# Patient Record
Sex: FEMALE | Race: WHITE | NOT HISPANIC OR LATINO | Employment: UNEMPLOYED | URBAN - NONMETROPOLITAN AREA
[De-identification: names, ages, dates, MRNs, and addresses within clinical notes are randomized per-mention and may not be internally consistent; named-entity substitution may affect disease eponyms.]

---

## 2017-08-10 ENCOUNTER — HOSPITAL ENCOUNTER (INPATIENT)
Facility: HOSPITAL | Age: 22
LOS: 5 days | Discharge: HOME | End: 2017-08-15
Attending: EMERGENCY MEDICINE | Admitting: INTERNAL MEDICINE

## 2017-08-10 ENCOUNTER — APPOINTMENT (OUTPATIENT)
Dept: GENERAL RADIOLOGY | Facility: HOSPITAL | Age: 22
End: 2017-08-10

## 2017-08-10 DIAGNOSIS — J45.901 ACUTE SEVERE EXACERBATION OF ASTHMA: Primary | ICD-10-CM

## 2017-08-10 PROBLEM — J96.90 RESPIRATORY FAILURE (HCC): Status: ACTIVE | Noted: 2017-08-10

## 2017-08-10 LAB
A-A DO2: 25.4 MMHG (ref 0–300)
ALBUMIN SERPL-MCNC: 5.3 G/DL (ref 3.5–5)
ALBUMIN/GLOB SERPL: 1.3 G/DL (ref 1.5–2.5)
ALP SERPL-CCNC: 139 U/L (ref 35–104)
ALT SERPL W P-5'-P-CCNC: 21 U/L (ref 10–36)
ANION GAP SERPL CALCULATED.3IONS-SCNC: 9.4 MMOL/L (ref 3.6–11.2)
ARTERIAL PATENCY WRIST A: ABNORMAL
AST SERPL-CCNC: 34 U/L (ref 10–30)
ATMOSPHERIC PRESS: 730 MMHG
BASE EXCESS BLDA CALC-SCNC: -3.7 MMOL/L
BASOPHILS # BLD AUTO: 0.03 10*3/MM3 (ref 0–0.3)
BASOPHILS NFR BLD AUTO: 0.2 % (ref 0–2)
BDY SITE: ABNORMAL
BILIRUB SERPL-MCNC: 0.4 MG/DL (ref 0.2–1.8)
BODY TEMPERATURE: 98.6 C
BUN BLD-MCNC: 9 MG/DL (ref 7–21)
BUN/CREAT SERPL: 12.7 (ref 7–25)
CALCIUM SPEC-SCNC: 10.3 MG/DL (ref 7.7–10)
CHLORIDE SERPL-SCNC: 104 MMOL/L (ref 99–112)
CO2 SERPL-SCNC: 26.6 MMOL/L (ref 24.3–31.9)
COHGB MFR BLD: 1.4 % (ref 0–5)
CREAT BLD-MCNC: 0.71 MG/DL (ref 0.43–1.29)
D-LACTATE SERPL-SCNC: 2 MMOL/L (ref 0.5–2)
D-LACTATE SERPL-SCNC: 2.2 MMOL/L (ref 0.5–2)
DEPRECATED RDW RBC AUTO: 45 FL (ref 37–54)
EOSINOPHIL # BLD AUTO: 1.01 10*3/MM3 (ref 0–0.7)
EOSINOPHIL NFR BLD AUTO: 7 % (ref 0–5)
ERYTHROCYTE [DISTWIDTH] IN BLOOD BY AUTOMATED COUNT: 14.3 % (ref 11.5–14.5)
GFR SERPL CREATININE-BSD FRML MDRD: 102 ML/MIN/1.73
GLOBULIN UR ELPH-MCNC: 4 GM/DL
GLUCOSE BLD-MCNC: 106 MG/DL (ref 70–110)
HCO3 BLDA-SCNC: 21.7 MMOL/L (ref 22–26)
HCT VFR BLD AUTO: 42.1 % (ref 37–47)
HCT VFR BLD CALC: 36 % (ref 37–47)
HGB BLD-MCNC: 12.9 G/DL (ref 12–16)
HGB BLDA-MCNC: 12.4 G/DL (ref 12–16)
HOLD SPECIMEN: NORMAL
HOLD SPECIMEN: NORMAL
HOROWITZ INDEX BLD+IHG-RTO: 21 %
IMM GRANULOCYTES # BLD: 0.04 10*3/MM3 (ref 0–0.03)
IMM GRANULOCYTES NFR BLD: 0.3 % (ref 0–0.5)
LYMPHOCYTES # BLD AUTO: 0.98 10*3/MM3 (ref 1–3)
LYMPHOCYTES NFR BLD AUTO: 6.7 % (ref 21–51)
MCH RBC QN AUTO: 26.2 PG (ref 27–33)
MCHC RBC AUTO-ENTMCNC: 30.6 G/DL (ref 33–37)
MCV RBC AUTO: 85.6 FL (ref 80–94)
METHGB BLD QL: 0.5 % (ref 0–3)
MODALITY: ABNORMAL
MONOCYTES # BLD AUTO: 0.37 10*3/MM3 (ref 0.1–0.9)
MONOCYTES NFR BLD AUTO: 2.5 % (ref 0–10)
NEUTROPHILS # BLD AUTO: 12.09 10*3/MM3 (ref 1.4–6.5)
NEUTROPHILS NFR BLD AUTO: 83.3 % (ref 30–70)
OSMOLALITY SERPL CALC.SUM OF ELEC: 278.5 MOSM/KG (ref 273–305)
OXYHGB MFR BLDV: 90 % (ref 85–100)
PCO2 BLDA: 40.6 MM HG (ref 35–45)
PH BLDA: 7.35 PH UNITS (ref 7.35–7.45)
PLATELET # BLD AUTO: 300 10*3/MM3 (ref 130–400)
PMV BLD AUTO: 11.1 FL (ref 6–10)
PO2 BLDA: 69.4 MM HG (ref 80–100)
POTASSIUM BLD-SCNC: 3.8 MMOL/L (ref 3.5–5.3)
PROT SERPL-MCNC: 9.3 G/DL (ref 6–8)
RBC # BLD AUTO: 4.92 10*6/MM3 (ref 4.2–5.4)
SAO2 % BLDCOA: 91.7 % (ref 90–100)
SODIUM BLD-SCNC: 140 MMOL/L (ref 135–153)
WBC NRBC COR # BLD: 14.52 10*3/MM3 (ref 4.5–12.5)
WHOLE BLOOD HOLD SPECIMEN: NORMAL
WHOLE BLOOD HOLD SPECIMEN: NORMAL

## 2017-08-10 PROCEDURE — 80053 COMPREHEN METABOLIC PANEL: CPT | Performed by: EMERGENCY MEDICINE

## 2017-08-10 PROCEDURE — 71010 HC CHEST PA OR AP: CPT

## 2017-08-10 PROCEDURE — 87040 BLOOD CULTURE FOR BACTERIA: CPT | Performed by: EMERGENCY MEDICINE

## 2017-08-10 PROCEDURE — 99285 EMERGENCY DEPT VISIT HI MDM: CPT

## 2017-08-10 PROCEDURE — 83735 ASSAY OF MAGNESIUM: CPT | Performed by: PHYSICIAN ASSISTANT

## 2017-08-10 PROCEDURE — 83605 ASSAY OF LACTIC ACID: CPT | Performed by: EMERGENCY MEDICINE

## 2017-08-10 PROCEDURE — 36600 WITHDRAWAL OF ARTERIAL BLOOD: CPT | Performed by: EMERGENCY MEDICINE

## 2017-08-10 PROCEDURE — 25010000002 AZITHROMYCIN: Performed by: EMERGENCY MEDICINE

## 2017-08-10 PROCEDURE — 99223 1ST HOSP IP/OBS HIGH 75: CPT | Performed by: INTERNAL MEDICINE

## 2017-08-10 PROCEDURE — 83050 HGB METHEMOGLOBIN QUAN: CPT | Performed by: EMERGENCY MEDICINE

## 2017-08-10 PROCEDURE — 71010 XR CHEST 1 VW: CPT | Performed by: RADIOLOGY

## 2017-08-10 PROCEDURE — 25010000002 CEFTRIAXONE: Performed by: EMERGENCY MEDICINE

## 2017-08-10 PROCEDURE — 94799 UNLISTED PULMONARY SVC/PX: CPT

## 2017-08-10 PROCEDURE — 25010000002 LORAZEPAM PER 2 MG: Performed by: EMERGENCY MEDICINE

## 2017-08-10 PROCEDURE — 82805 BLOOD GASES W/O2 SATURATION: CPT | Performed by: EMERGENCY MEDICINE

## 2017-08-10 PROCEDURE — 94640 AIRWAY INHALATION TREATMENT: CPT

## 2017-08-10 PROCEDURE — 36415 COLL VENOUS BLD VENIPUNCTURE: CPT

## 2017-08-10 PROCEDURE — 82375 ASSAY CARBOXYHB QUANT: CPT | Performed by: EMERGENCY MEDICINE

## 2017-08-10 PROCEDURE — 25010000002 EPINEPHRINE 1 MG/ML SOLUTION: Performed by: EMERGENCY MEDICINE

## 2017-08-10 PROCEDURE — 85025 COMPLETE CBC W/AUTO DIFF WBC: CPT | Performed by: EMERGENCY MEDICINE

## 2017-08-10 RX ORDER — ALBUTEROL SULFATE 2.5 MG/3ML
2.5 SOLUTION RESPIRATORY (INHALATION) ONCE
Status: COMPLETED | OUTPATIENT
Start: 2017-08-10 | End: 2017-08-10

## 2017-08-10 RX ORDER — SODIUM CHLORIDE 9 MG/ML
100 INJECTION, SOLUTION INTRAVENOUS CONTINUOUS
Status: DISCONTINUED | OUTPATIENT
Start: 2017-08-11 | End: 2017-08-11

## 2017-08-10 RX ORDER — SODIUM CHLORIDE 9 MG/ML
125 INJECTION, SOLUTION INTRAVENOUS CONTINUOUS
Status: DISCONTINUED | OUTPATIENT
Start: 2017-08-10 | End: 2017-08-10

## 2017-08-10 RX ORDER — SODIUM CHLORIDE 9 MG/ML
125 INJECTION, SOLUTION INTRAVENOUS CONTINUOUS
Status: DISCONTINUED | OUTPATIENT
Start: 2017-08-10 | End: 2017-08-11 | Stop reason: DRUGHIGH

## 2017-08-10 RX ORDER — IPRATROPIUM BROMIDE AND ALBUTEROL SULFATE 2.5; .5 MG/3ML; MG/3ML
SOLUTION RESPIRATORY (INHALATION)
Status: COMPLETED
Start: 2017-08-10 | End: 2017-08-10

## 2017-08-10 RX ORDER — BUPRENORPHINE 2 MG/1
8 TABLET SUBLINGUAL ONCE
Status: COMPLETED | OUTPATIENT
Start: 2017-08-10 | End: 2017-08-10

## 2017-08-10 RX ORDER — IPRATROPIUM BROMIDE AND ALBUTEROL SULFATE 2.5; .5 MG/3ML; MG/3ML
3 SOLUTION RESPIRATORY (INHALATION)
Status: DISCONTINUED | OUTPATIENT
Start: 2017-08-10 | End: 2017-08-15 | Stop reason: HOSPADM

## 2017-08-10 RX ORDER — SODIUM CHLORIDE 0.9 % (FLUSH) 0.9 %
10 SYRINGE (ML) INJECTION AS NEEDED
Status: DISCONTINUED | OUTPATIENT
Start: 2017-08-10 | End: 2017-08-15 | Stop reason: HOSPADM

## 2017-08-10 RX ORDER — EPINEPHRINE 1 MG/ML
0.3 INJECTION INTRAMUSCULAR; INTRAVENOUS; SUBCUTANEOUS ONCE
Status: COMPLETED | OUTPATIENT
Start: 2017-08-10 | End: 2017-08-10

## 2017-08-10 RX ORDER — ALBUTEROL SULFATE 2.5 MG/3ML
2.5 SOLUTION RESPIRATORY (INHALATION) EVERY 6 HOURS PRN
Status: DISCONTINUED | OUTPATIENT
Start: 2017-08-10 | End: 2017-08-15 | Stop reason: HOSPADM

## 2017-08-10 RX ORDER — ALBUTEROL SULFATE 2.5 MG/3ML
2.5 SOLUTION RESPIRATORY (INHALATION)
Status: DISCONTINUED | OUTPATIENT
Start: 2017-08-11 | End: 2017-08-11 | Stop reason: SDUPTHER

## 2017-08-10 RX ORDER — IPRATROPIUM BROMIDE AND ALBUTEROL SULFATE 2.5; .5 MG/3ML; MG/3ML
3 SOLUTION RESPIRATORY (INHALATION) ONCE
Status: COMPLETED | OUTPATIENT
Start: 2017-08-10 | End: 2017-08-10

## 2017-08-10 RX ORDER — FAMOTIDINE 10 MG/ML
20 INJECTION, SOLUTION INTRAVENOUS EVERY 12 HOURS SCHEDULED
Status: DISCONTINUED | OUTPATIENT
Start: 2017-08-11 | End: 2017-08-15

## 2017-08-10 RX ORDER — SODIUM CHLORIDE 0.9 % (FLUSH) 0.9 %
1-10 SYRINGE (ML) INJECTION AS NEEDED
Status: DISCONTINUED | OUTPATIENT
Start: 2017-08-10 | End: 2017-08-15 | Stop reason: HOSPADM

## 2017-08-10 RX ORDER — SODIUM CHLORIDE 9 MG/ML
125 INJECTION, SOLUTION INTRAVENOUS CONTINUOUS
Status: DISCONTINUED | OUTPATIENT
Start: 2017-08-10 | End: 2017-08-11

## 2017-08-10 RX ORDER — LORAZEPAM 2 MG/ML
1 INJECTION INTRAMUSCULAR ONCE
Status: COMPLETED | OUTPATIENT
Start: 2017-08-10 | End: 2017-08-10

## 2017-08-10 RX ORDER — HEPARIN SODIUM 5000 [USP'U]/ML
5000 INJECTION, SOLUTION INTRAVENOUS; SUBCUTANEOUS EVERY 12 HOURS SCHEDULED
Status: DISCONTINUED | OUTPATIENT
Start: 2017-08-11 | End: 2017-08-15 | Stop reason: HOSPADM

## 2017-08-10 RX ORDER — ALBUTEROL SULFATE 90 UG/1
2 AEROSOL, METERED RESPIRATORY (INHALATION) EVERY 6 HOURS PRN
COMMUNITY
End: 2017-09-14 | Stop reason: SDUPTHER

## 2017-08-10 RX ORDER — BUPRENORPHINE HYDROCHLORIDE 8 MG/1
10 TABLET SUBLINGUAL DAILY
COMMUNITY

## 2017-08-10 RX ORDER — METHYLPREDNISOLONE SODIUM SUCCINATE 40 MG/ML
40 INJECTION, POWDER, LYOPHILIZED, FOR SOLUTION INTRAMUSCULAR; INTRAVENOUS EVERY 12 HOURS
Status: DISCONTINUED | OUTPATIENT
Start: 2017-08-11 | End: 2017-08-15

## 2017-08-10 RX ORDER — PREDNISONE 20 MG/1
40 TABLET ORAL
Status: DISCONTINUED | OUTPATIENT
Start: 2017-08-11 | End: 2017-08-11

## 2017-08-10 RX ORDER — BUDESONIDE 0.25 MG/2ML
0.25 INHALANT ORAL
Status: DISCONTINUED | OUTPATIENT
Start: 2017-08-11 | End: 2017-08-15 | Stop reason: HOSPADM

## 2017-08-10 RX ADMIN — ALBUTEROL SULFATE 2.5 MG: 2.5 SOLUTION RESPIRATORY (INHALATION) at 21:32

## 2017-08-10 RX ADMIN — AZITHROMYCIN 500 MG: 500 INJECTION, POWDER, LYOPHILIZED, FOR SOLUTION INTRAVENOUS at 16:31

## 2017-08-10 RX ADMIN — IPRATROPIUM BROMIDE AND ALBUTEROL SULFATE 3 ML: .5; 3 SOLUTION RESPIRATORY (INHALATION) at 12:21

## 2017-08-10 RX ADMIN — SODIUM CHLORIDE 125 ML/HR: 9 INJECTION, SOLUTION INTRAVENOUS at 21:51

## 2017-08-10 RX ADMIN — SODIUM CHLORIDE 125 ML/HR: 9 INJECTION, SOLUTION INTRAVENOUS at 19:23

## 2017-08-10 RX ADMIN — BUPRENORPHINE HCL 8 MG: 2 TABLET SUBLINGUAL at 21:23

## 2017-08-10 RX ADMIN — EPINEPHRINE 0.3 MG: 1 INJECTION INTRAMUSCULAR; INTRAVENOUS; SUBCUTANEOUS at 13:21

## 2017-08-10 RX ADMIN — SODIUM CHLORIDE 500 ML: 9 INJECTION, SOLUTION INTRAVENOUS at 14:37

## 2017-08-10 RX ADMIN — IPRATROPIUM BROMIDE AND ALBUTEROL SULFATE 3 ML: .5; 3 SOLUTION RESPIRATORY (INHALATION) at 19:00

## 2017-08-10 RX ADMIN — LORAZEPAM 1 MG: 2 INJECTION INTRAMUSCULAR; INTRAVENOUS at 13:18

## 2017-08-10 RX ADMIN — IPRATROPIUM BROMIDE AND ALBUTEROL SULFATE 3 ML: .5; 3 SOLUTION RESPIRATORY (INHALATION) at 16:51

## 2017-08-10 RX ADMIN — SODIUM CHLORIDE 500 ML: 9 INJECTION, SOLUTION INTRAVENOUS at 21:59

## 2017-08-10 RX ADMIN — IPRATROPIUM BROMIDE AND ALBUTEROL SULFATE 3 ML: .5; 3 SOLUTION RESPIRATORY (INHALATION) at 23:22

## 2017-08-10 RX ADMIN — CEFTRIAXONE 1 G: 1 INJECTION, POWDER, FOR SOLUTION INTRAMUSCULAR; INTRAVENOUS at 14:38

## 2017-08-10 NOTE — ED NOTES
Patient sitting up in bed. No acute distress noted. Family at bedside. Awaiting bed assignment.     Mindi Baldwin RN  08/10/17 0868

## 2017-08-10 NOTE — ED PROVIDER NOTES
Subjective   HPI Comments: Long history of asthma, this acute exacerbation started yesterday    Patient is a 22 y.o. female presenting with wheezing.   History provided by:  Patient   used: No    Wheezing   Severity:  Severe  Severity compared to prior episodes:  Similar  Onset quality:  Sudden  Duration:  1 day  Timing:  Constant  Progression:  Worsening  Chronicity:  Chronic  Context: strong odors    Relieved by:  Nothing  Worsened by:  Activity  Ineffective treatments:  Beta-agonist inhaler and ipratropium inhaler  Associated symptoms: chest tightness, cough and shortness of breath    Associated symptoms: no chest pain and no fever        Review of Systems   Constitutional: Negative.  Negative for fever.   HENT: Negative.    Respiratory: Positive for cough, chest tightness, shortness of breath and wheezing.    Cardiovascular: Negative.  Negative for chest pain.   Gastrointestinal: Negative.  Negative for abdominal pain.   Endocrine: Negative.    Genitourinary: Negative.  Negative for dysuria.   Skin: Negative.    Neurological: Negative.    Psychiatric/Behavioral: Negative.    All other systems reviewed and are negative.      No past medical history on file.    Allergies   Allergen Reactions   • Shellfish-Derived Products        No past surgical history on file.    No family history on file.    Social History     Social History   • Marital status:      Spouse name: N/A   • Number of children: N/A   • Years of education: N/A     Social History Main Topics   • Smoking status: Not on file   • Smokeless tobacco: Not on file   • Alcohol use Not on file   • Drug use: Not on file   • Sexual activity: Not on file     Other Topics Concern   • Not on file     Social History Narrative           Objective   Physical Exam   Constitutional: She is oriented to person, place, and time. She appears well-developed and well-nourished. No distress.   HENT:   Head: Normocephalic and atraumatic.   Right Ear:  External ear normal.   Left Ear: External ear normal.   Nose: Nose normal.   Mouth/Throat: Oropharynx is clear and moist.   Eyes: Conjunctivae and EOM are normal. Pupils are equal, round, and reactive to light.   Neck: Normal range of motion. Neck supple. No JVD present. No tracheal deviation present.   Cardiovascular: Normal rate, regular rhythm and normal heart sounds.    No murmur heard.  Pulmonary/Chest: Effort normal. No respiratory distress. She has wheezes.   Abdominal: Soft. Bowel sounds are normal. There is no tenderness.   Musculoskeletal: Normal range of motion. She exhibits no edema or deformity.   Neurological: She is alert and oriented to person, place, and time. No cranial nerve deficit.   Skin: Skin is warm and dry. No rash noted. She is not diaphoretic. No erythema. No pallor.   Psychiatric: She has a normal mood and affect. Her behavior is normal. Thought content normal.   Nursing note and vitals reviewed.      Procedures  Lab Results (last 24 hours)     Procedure Component Value Units Date/Time    CBC & Differential [[id number]] Collected:  08/10/17 1238    Specimen:  Blood Updated:  08/10/17 1259    Narrative:       The following orders were created for panel order CBC & Differential.  Procedure                               Abnormality         Status                     ---------                               -----------         ------                     CBC Auto Differential[[id number]]         Abnormal            Final result                 Please view results for these tests on the individual orders.    Comprehensive Metabolic Panel [[id number]]  (Abnormal) Collected:  08/10/17 1238    Specimen:  Blood from Arm, Left Updated:  08/10/17 1317     Glucose 106 mg/dL      BUN 9 mg/dL      Creatinine 0.71 mg/dL      Sodium 140 mmol/L      Potassium 3.8 mmol/L      Chloride 104 mmol/L      CO2 26.6 mmol/L      Calcium 10.3 (H) mg/dL      Total Protein 9.3 (H) g/dL      Albumin 5.30 (H) g/dL      ALT  (SGPT) 21 U/L      AST (SGOT) 34 (H) U/L      Alkaline Phosphatase 139 (H) U/L       Note New Reference Ranges        Total Bilirubin 0.4 mg/dL      eGFR Non African Amer 102 mL/min/1.73      Globulin 4.0 gm/dL      A/G Ratio 1.3 (L) g/dL      BUN/Creatinine Ratio 12.7     Anion Gap 9.4 mmol/L     Lactic Acid, Plasma [[id number]]  (Abnormal) Collected:  08/10/17 1238    Specimen:  Blood from Arm, Left Updated:  08/10/17 1313     Lactate 2.2 (C) mmol/L     Blood Culture [[id number]] Collected:  08/10/17 1238    Specimen:  Blood from Arm, Left Updated:  08/10/17 1415    CBC Auto Differential [[id number]]  (Abnormal) Collected:  08/10/17 1238    Specimen:  Blood from Arm, Left Updated:  08/10/17 1259     WBC 14.52 (H) 10*3/mm3      RBC 4.92 10*6/mm3      Hemoglobin 12.9 g/dL      Hematocrit 42.1 %      MCV 85.6 fL      MCH 26.2 (L) pg      MCHC 30.6 (L) g/dL      RDW 14.3 %      RDW-SD 45.0 fl      MPV 11.1 (H) fL      Platelets 300 10*3/mm3      Neutrophil % 83.3 (H) %      Lymphocyte % 6.7 (L) %      Monocyte % 2.5 %      Eosinophil % 7.0 (H) %      Basophil % 0.2 %      Immature Grans % 0.3 %      Neutrophils, Absolute 12.09 (H) 10*3/mm3      Lymphocytes, Absolute 0.98 (L) 10*3/mm3      Monocytes, Absolute 0.37 10*3/mm3      Eosinophils, Absolute 1.01 (H) 10*3/mm3      Basophils, Absolute 0.03 10*3/mm3      Immature Grans, Absolute 0.04 (H) 10*3/mm3     Blood Culture [[id number]] Collected:  08/10/17 1332    Specimen:  Blood from Hand, Left Updated:  08/10/17 1415    Blood Gas, Arterial With Co-Ox [[id number]]  (Abnormal) Collected:  08/10/17 1603    Specimen:  Arterial Blood Updated:  08/10/17 1607     Site Arterial: left brachial     Gary's Test N/A     pH, Arterial 7.346 (L) pH units      pCO2, Arterial 40.6 mm Hg      pO2, Arterial 69.4 (L) mm Hg      HCO3, Arterial 21.7 (L) mmol/L      Base Excess, Arterial -3.7 mmol/L      O2 Saturation, Arterial 91.7 %      Hemoglobin, Blood Gas 12.4 g/dL      Hematocrit, Blood Gas  36.0 (L) %      Oxyhemoglobin 90.0 %      Methemoglobin 0.50 %      Carboxyhemoglobin 1.4 %      A-a Gradiant 25.4 mmHg      Temperature 98.6 C      Barometric Pressure for Blood Gas 730 mmHg      Modality Room air     FIO2 21 %         XR Chest 1 View   Final Result   No evidence of active or acute cardiopulmonary disease on today's chest   radiograph.           This report was finalized on 8/10/2017 12:48 PM by Dr. Juan J Solo MD.                   ED Course  ED Course      Patient still wheezing with some labored breathing.  Discussed Dr. Knight will admit to CCU            MDM    Final diagnoses:   Acute severe exacerbation of asthma            Jeremy Olmstead MD  08/10/17 9171

## 2017-08-10 NOTE — ED NOTES
Provider made aware of positive simple sepsis screen triage assessment     Jocelyn Dominguez RN  08/10/17 7651

## 2017-08-10 NOTE — H&P
Patient Identification:  Name:  Deborah Rosas  Age:  22 y.o.  Sex:  female  :  1995  MRN:  [id number]   Visit Number:  [id number]  Primary Care Physician:  Vic Somers MD    I have seen the patient in conjunction with Elvira Alfredo PA-C and I agree with the following statements. I have made any necessary changes below to reflect my findings.      Chief complaint:  Short of breath    History of presenting illness:  Ms. Rosas is a 21 yo female who has a known history of severe asthma. She presented to the ED at our facility today with progressively worsening shortness of breath for the past 2-3 days duration in spite of home treatments with inhalants.  She received a small amount of epinephrine in the ED in addition to ativan.  She denies cough, fever, and chills. She denies chest pain.  She reports that this feels like her usual asthma exacerbation and reports she has been hospitalized multiple times in her life with asthma exacerbations.  She does also report that she had a baby around 45 days ago at [facility]. She denies known complications. She states her asthma seemed improved throughout her pregnancy. She does not appear fluid overloaded.  Given acidotic pH and pCO2 of 40, she is being placed in the ICU for further evaluation and treatment. During evaluation she was 97% on 3 liters of oxygen via nasal cannula.  She has never established with a pulmonlogist as an outpatient, despite recommendations for close outpatient follow-up when she was discharged from our facility last year.  She denies known pulmonary diagnoses with the exception of asthma.     During my encounter, the patient was on her cell phone texting while I was attempting to explain the severity of her illness. I spent an extended period of time explaining to she and her father that she is at high risk for decompensation in the setting of her severe asthma exacerbation.          ---------------------------------------------------------------------------------------------------------------------   Review of Systems   Constitutional: Negative for activity change and appetite change.   HENT: Negative for congestion and dental problem.    Eyes: Negative for discharge and itching.   Respiratory: Positive for cough, shortness of breath and wheezing. Negative for choking and chest tightness.    Cardiovascular: Negative for chest pain and leg swelling.   Gastrointestinal: Negative for abdominal distention, abdominal pain and anal bleeding.   Endocrine: Negative for cold intolerance and heat intolerance.   Genitourinary: Negative for difficulty urinating and dysuria.   Musculoskeletal: Negative for back pain and joint swelling.   Skin: Negative for color change and pallor.   Allergic/Immunologic: Negative for environmental allergies and food allergies.   Neurological: Negative for dizziness, light-headedness and headaches.   Hematological: Negative for adenopathy. Does not bruise/bleed easily.   Psychiatric/Behavioral: Negative for agitation and behavioral problems.      ---------------------------------------------------------------------------------------------------------------------   Past Medical History:   Diagnosis Date   • Asthma    • Seizures     Seizures as a child with last seizure around 10 years ago     Past Surgical History:   Procedure Laterality Date   • HERNIA REPAIR       Family History   Problem Relation Age of Onset   • COPD Mother      Social History     Social History   • Marital status:      Spouse name: N/A   • Number of children: N/A   • Years of education: N/A     Social History Main Topics   • Smoking status: Never Smoker   • Smokeless tobacco: None   • Alcohol use No   • Drug use: No   • Sexual activity: Not Asked     Other Topics Concern   • None     Social History Narrative   • None      ---------------------------------------------------------------------------------------------------------------------   Allergies:  Shellfish-derived products  ---------------------------------------------------------------------------------------------------------------------   Prior to Admission Medications     None        Hospital Scheduled Meds:        ---------------------------------------------------------------------------------------------------------------------   Vital Signs:  Temp:  [98 °F (36.7 °C)] 98 °F (36.7 °C)  Heart Rate:  [126-137] 130  Resp:  [22] 22  BP: (114-127)/(65-80) 127/72  Last 3 weights    08/10/17  1151   Weight: 106 lb (48.1 kg)     Body mass index is 18.19 kg/(m^2).  ---------------------------------------------------------------------------------------------------------------------   Physical Exam:  Constitutional:  Very thin appearing.  Flat affect.      HENT:  Head: Normocephalic and atraumatic.  Mouth:  Moist mucous membranes.    Eyes:  Conjunctivae and EOM are normal.  Pupils are equal, round, and reactive to light.  No scleral icterus.  Neck:  Neck supple.  No JVD present.    Cardiovascular:  Tachycardic. No murmur, rubs or gallops appreciated.   Pulmonary/Chest: Respirations regular, even and unlabored. Diffuse wheezing throughout with poor air movement.    Abdominal:  Soft.  Nontender. No guarding or rebound tenderness. Bowel sounds present x 4.    Musculoskeletal:  No edema, no tenderness, and no deformity.  No red or swollen joints anywhere.    Neurological:  Alert and oriented to person, place, and time.  No cranial nerve deficit.  No tongue deviation.  No facial droop.  No slurred speech.   Skin:  Skin is warm and dry.  No rash noted.  No pallor.   Psychiatric:  Normal mood and affect.  Behavior is normal.  Judgment and thought content normal.   Peripheral vascular:  No edema and strong pulses on all 4  extremities.  ---------------------------------------------------------------------------------------------------------------------  EKG:  PENDING      Telemetry:  Sinus tach in the 110s-130s  I have personally looked at the telemetry strips.  ---------------------------------------------------------------------------------------------------------------------     Results from last 7 days  Lab Units 08/10/17  1238   LACTATE mmol/L 2.2*   WBC 10*3/mm3 14.52*   HEMOGLOBIN g/dL 12.9   HEMATOCRIT % 42.1   MCV fL 85.6   MCHC g/dL 30.6*   PLATELETS 10*3/mm3 300       Results from last 7 days  Lab Units 08/10/17  1603   PH, ARTERIAL pH units 7.346*   PO2 ART mm Hg 69.4*   PCO2, ARTERIAL mm Hg 40.6   HCO3 ART mmol/L 21.7*       Results from last 7 days  Lab Units 08/10/17  1238   SODIUM mmol/L 140   POTASSIUM mmol/L 3.8   CHLORIDE mmol/L 104   CO2 mmol/L 26.6   BUN mg/dL 9   CREATININE mg/dL 0.71   EGFR IF NONAFRICN AM mL/min/1.73 102   CALCIUM mg/dL 10.3*   GLUCOSE mg/dL 106   ALBUMIN g/dL 5.30*   BILIRUBIN mg/dL 0.4   ALK PHOS U/L 139*   AST (SGOT) U/L 34*   ALT (SGPT) U/L 21   Estimated Creatinine Clearance: 93.6 mL/min (by DELILAH-G formula based on Cr of 0.71).  No results found for: AMMONIA          Lab Results   Component Value Date    HGBA1C 5.2 06/03/2016     No results found for: TSH, FREET4  Lab Results   Component Value Date    PREGTESTUR Negative 06/02/2016     Pain Management Panel     Pain Management Panel Latest Ref Rng & Units 6/2/2016    AMPHETAMINES SCREEN, URINE Negative Negative    BARBITURATES SCREEN Negative Negative    BENZODIAZEPINE SCREEN, URINE Negative Negative    COCAINE SCREEN, URINE Negative Negative    METHADONE SCREEN, URINE Negative Negative                        ---------------------------------------------------------------------------------------------------------------------  Imaging Results (last 7 days)     Procedure Component Value Units Date/Time    XR Chest 1 View [[id number]] Collected:   08/10/17 1248     Updated:  08/10/17 1308    Narrative:       XR CHEST 1 VW-     CLINICAL INDICATION: Simple sepsis protocol          COMPARISON: 6/3/2016      TECHNIQUE: Single frontal view of the chest.     FINDINGS:     There is no focal alveolar infiltrate or effusion.  The cardiac silhouette is normal. The pulmonary vasculature is  unremarkable.  There is no evidence of an acute osseous abnormality.   There are no suspicious-appearing parenchymal soft tissue nodules.            Impression:       No evidence of active or acute cardiopulmonary disease on today's chest  radiograph.         This report was finalized on 8/10/2017 12:48 PM by Dr. Juan J Solo MD.             Cultures:   PENDING      I have personally reviewed the radiology images and read the final radiology report.  ---------------------------------------------------------------------------------------------------------------------  Assessment and Plan:    -Acute severe exacerbation of asthma with acute hypercapnic respiratory failure:  IV epinephrine and Ativan were given in ED.  Start IV Solumedrol 40mg IV BID. Albuterol inhalants in addition to budesonide inhalants have been ordered.  IV Pepcid has also ordered. Echocardiogram ordered given post-partum state with dyspnea. Will monitor closely in the CCU as pt is at high risk for decompensation. Consult pulmonology.     -Mildly elevated lactic acid:  Possibly secondary to asthma exacerbation in addition to albuterol. Resolved on repeat lab draw. Will gently hydrate.    -Leukocytosis:  Possibly reactive. Blood cultures have been obtained. Will obtain sputum culture and rule out atypical pneumonias with hx of mycoplasma pneumonia. No evidence of pneumonia on CXR. She did receive Rocephin and Azithromcyin in the ED. Urinalysis is pending at present.  Start gentle hydration.     -Tachycardia: Likely 2/2 above. Check EKG and CE's.     -Post-partum state:  Echocardiogram ordered in the setting of  dyspnea, although pt does not appear volume overloaded.     -Mildly elevated liver enzymes: Check hepatitis panel. Repeat CMP in the AM.     -DVT PPX: SQ heparin    Pt is at high risk 2/2 severe asthma exacerbation at high risk for decompensation.       Elvira Alfredo PA-C  08/10/17  5:37 PM  ---------------------------------------------------------------------------------------------------------------------     I have reviewed the notes, assessments, and/or procedures performed by Elvira Alfredo PA-C. I concur with her/his documentation of Deborah Rosas.  Dimitrios Blackman D.O.

## 2017-08-11 ENCOUNTER — APPOINTMENT (OUTPATIENT)
Dept: CARDIOLOGY | Facility: HOSPITAL | Age: 22
End: 2017-08-11

## 2017-08-11 LAB
6-ACETYL MORPHINE: NEGATIVE
A-A DO2: 59.4 MMHG (ref 0–300)
ALBUMIN SERPL-MCNC: 3.8 G/DL (ref 3.5–5)
ALBUMIN/GLOB SERPL: 1.3 G/DL (ref 1.5–2.5)
ALP SERPL-CCNC: 92 U/L (ref 35–104)
ALT SERPL W P-5'-P-CCNC: 16 U/L (ref 10–36)
AMPHET+METHAMPHET UR QL: NEGATIVE
ANION GAP SERPL CALCULATED.3IONS-SCNC: 6.6 MMOL/L (ref 3.6–11.2)
ANION GAP SERPL CALCULATED.3IONS-SCNC: 6.8 MMOL/L (ref 3.6–11.2)
ARTERIAL PATENCY WRIST A: ABNORMAL
AST SERPL-CCNC: 26 U/L (ref 10–30)
ATMOSPHERIC PRESS: 730 MMHG
B-HCG UR QL: NEGATIVE
BACTERIA UR QL AUTO: ABNORMAL /HPF
BARBITURATES UR QL SCN: NEGATIVE
BASE EXCESS BLDA CALC-SCNC: -5.3 MMOL/L
BASOPHILS # BLD AUTO: 0.01 10*3/MM3 (ref 0–0.3)
BASOPHILS # BLD AUTO: 0.01 10*3/MM3 (ref 0–0.3)
BASOPHILS NFR BLD AUTO: 0.1 % (ref 0–2)
BASOPHILS NFR BLD AUTO: 0.1 % (ref 0–2)
BDY SITE: ABNORMAL
BENZODIAZ UR QL SCN: POSITIVE
BH CV ECHO MEAS - AO MAX PG (FULL): 3.9 MMHG
BH CV ECHO MEAS - AO MAX PG: 8.8 MMHG
BH CV ECHO MEAS - AO MEAN PG (FULL): 1.1 MMHG
BH CV ECHO MEAS - AO MEAN PG: 4 MMHG
BH CV ECHO MEAS - AO ROOT AREA (BSA CORRECTED): 1.5
BH CV ECHO MEAS - AO ROOT AREA: 3.9 CM^2
BH CV ECHO MEAS - AO ROOT DIAM: 2.2 CM
BH CV ECHO MEAS - AO V2 MAX: 148.3 CM/SEC
BH CV ECHO MEAS - AO V2 MEAN: 91.4 CM/SEC
BH CV ECHO MEAS - AO V2 VTI: 22.9 CM
BH CV ECHO MEAS - BSA(HAYCOCK): 1.5 M^2
BH CV ECHO MEAS - BSA: 1.5 M^2
BH CV ECHO MEAS - BZI_BMI: 18.4 KILOGRAMS/M^2
BH CV ECHO MEAS - BZI_METRIC_HEIGHT: 162.6 CM
BH CV ECHO MEAS - BZI_METRIC_WEIGHT: 48.5 KG
BH CV ECHO MEAS - CONTRAST EF 4CH: 68.4 ML/M^2
BH CV ECHO MEAS - EDV(CUBED): 71.9 ML
BH CV ECHO MEAS - EDV(MOD-SP4): 57 ML
BH CV ECHO MEAS - EDV(TEICH): 76.8 ML
BH CV ECHO MEAS - EF(CUBED): 55.6 %
BH CV ECHO MEAS - EF(MOD-SP4): 68.4 %
BH CV ECHO MEAS - EF(TEICH): 47.7 %
BH CV ECHO MEAS - ESV(CUBED): 31.9 ML
BH CV ECHO MEAS - ESV(MOD-SP4): 18 ML
BH CV ECHO MEAS - ESV(TEICH): 40.1 ML
BH CV ECHO MEAS - FS: 23.7 %
BH CV ECHO MEAS - IVS/LVPW: 0.89
BH CV ECHO MEAS - IVSD: 0.63 CM
BH CV ECHO MEAS - LA DIMENSION: 2.6 CM
BH CV ECHO MEAS - LA/AO: 1.1
BH CV ECHO MEAS - LV DIASTOLIC VOL/BSA (35-75): 38 ML/M^2
BH CV ECHO MEAS - LV MASS(C)D: 78.6 GRAMS
BH CV ECHO MEAS - LV MASS(C)DI: 52.4 GRAMS/M^2
BH CV ECHO MEAS - LV MAX PG: 4.9 MMHG
BH CV ECHO MEAS - LV MEAN PG: 2.8 MMHG
BH CV ECHO MEAS - LV SYSTOLIC VOL/BSA (12-30): 12 ML/M^2
BH CV ECHO MEAS - LV V1 MAX: 110.4 CM/SEC
BH CV ECHO MEAS - LV V1 MEAN: 77.6 CM/SEC
BH CV ECHO MEAS - LV V1 VTI: 18.3 CM
BH CV ECHO MEAS - LVIDD: 4.2 CM
BH CV ECHO MEAS - LVIDS: 3.2 CM
BH CV ECHO MEAS - LVLD AP4: 7.5 CM
BH CV ECHO MEAS - LVLS AP4: 5.4 CM
BH CV ECHO MEAS - LVPWD: 0.7 CM
BH CV ECHO MEAS - MV A MAX VEL: 112.3 CM/SEC
BH CV ECHO MEAS - MV DEC TIME: 0.14 SEC
BH CV ECHO MEAS - MV E MAX VEL: 109.8 CM/SEC
BH CV ECHO MEAS - MV E/A: 0.98
BH CV ECHO MEAS - MV MAX PG: 4.9 MMHG
BH CV ECHO MEAS - MV MEAN PG: 2.7 MMHG
BH CV ECHO MEAS - MV V2 MAX: 111.1 CM/SEC
BH CV ECHO MEAS - MV V2 MEAN: 76.7 CM/SEC
BH CV ECHO MEAS - MV V2 VTI: 19.7 CM
BH CV ECHO MEAS - PA ACC SLOPE: 474.2 CM/SEC^2
BH CV ECHO MEAS - PA ACC TIME: 0.17 SEC
BH CV ECHO MEAS - PA MAX PG: 5.7 MMHG
BH CV ECHO MEAS - PA MEAN PG: 2.9 MMHG
BH CV ECHO MEAS - PA PR(ACCEL): 4.5 MMHG
BH CV ECHO MEAS - PA V2 MAX: 119.4 CM/SEC
BH CV ECHO MEAS - PA V2 MEAN: 78.1 CM/SEC
BH CV ECHO MEAS - PA V2 VTI: 19.8 CM
BH CV ECHO MEAS - RAP SYSTOLE: 10 MMHG
BH CV ECHO MEAS - RVDD: 1.8 CM
BH CV ECHO MEAS - RVSP: 31 MMHG
BH CV ECHO MEAS - SI(AO): 59.5 ML/M^2
BH CV ECHO MEAS - SI(CUBED): 26.7 ML/M^2
BH CV ECHO MEAS - SI(MOD-SP4): 26 ML/M^2
BH CV ECHO MEAS - SI(TEICH): 24.4 ML/M^2
BH CV ECHO MEAS - SV(AO): 89.2 ML
BH CV ECHO MEAS - SV(CUBED): 40 ML
BH CV ECHO MEAS - SV(MOD-SP4): 39 ML
BH CV ECHO MEAS - SV(TEICH): 36.7 ML
BH CV ECHO MEAS - TR MAX VEL: 229.2 CM/SEC
BILIRUB SERPL-MCNC: 0.3 MG/DL (ref 0.2–1.8)
BILIRUB UR QL STRIP: NEGATIVE
BODY TEMPERATURE: 98.6 C
BUN BLD-MCNC: 6 MG/DL (ref 7–21)
BUN BLD-MCNC: 8 MG/DL (ref 7–21)
BUN/CREAT SERPL: 10.7 (ref 7–25)
BUN/CREAT SERPL: 13.3 (ref 7–25)
BUPRENORPHINE SERPL-MCNC: POSITIVE NG/ML
CALCIUM SPEC-SCNC: 8.9 MG/DL (ref 7.7–10)
CALCIUM SPEC-SCNC: 8.9 MG/DL (ref 7.7–10)
CANNABINOIDS SERPL QL: NEGATIVE
CHLORIDE SERPL-SCNC: 109 MMOL/L (ref 99–112)
CHLORIDE SERPL-SCNC: 111 MMOL/L (ref 99–112)
CK SERPL-CCNC: 266 U/L (ref 24–173)
CK SERPL-CCNC: 282 U/L (ref 24–173)
CK SERPL-CCNC: 303 U/L (ref 24–173)
CLARITY UR: ABNORMAL
CO2 SERPL-SCNC: 21.4 MMOL/L (ref 24.3–31.9)
CO2 SERPL-SCNC: 24.2 MMOL/L (ref 24.3–31.9)
COCAINE UR QL: NEGATIVE
COHGB MFR BLD: 0.8 % (ref 0–5)
COLOR UR: YELLOW
CREAT BLD-MCNC: 0.56 MG/DL (ref 0.43–1.29)
CREAT BLD-MCNC: 0.6 MG/DL (ref 0.43–1.29)
DEPRECATED RDW RBC AUTO: 43.7 FL (ref 37–54)
DEPRECATED RDW RBC AUTO: 44.5 FL (ref 37–54)
EOSINOPHIL # BLD AUTO: 0.01 10*3/MM3 (ref 0–0.7)
EOSINOPHIL # BLD AUTO: 0.02 10*3/MM3 (ref 0–0.7)
EOSINOPHIL NFR BLD AUTO: 0.1 % (ref 0–5)
EOSINOPHIL NFR BLD AUTO: 0.2 % (ref 0–5)
ERYTHROCYTE [DISTWIDTH] IN BLOOD BY AUTOMATED COUNT: 14.2 % (ref 11.5–14.5)
ERYTHROCYTE [DISTWIDTH] IN BLOOD BY AUTOMATED COUNT: 14.5 % (ref 11.5–14.5)
GFR SERPL CREATININE-BSD FRML MDRD: 124 ML/MIN/1.73
GFR SERPL CREATININE-BSD FRML MDRD: 134 ML/MIN/1.73
GLOBULIN UR ELPH-MCNC: 2.9 GM/DL
GLUCOSE BLD-MCNC: 125 MG/DL (ref 70–110)
GLUCOSE BLD-MCNC: 133 MG/DL (ref 70–110)
GLUCOSE UR STRIP-MCNC: NEGATIVE MG/DL
HAV IGM SERPL QL IA: NORMAL
HBV CORE IGM SERPL QL IA: NORMAL
HBV SURFACE AG SERPL QL IA: NORMAL
HCO3 BLDA-SCNC: 19.6 MMOL/L (ref 22–26)
HCT VFR BLD AUTO: 32.6 % (ref 37–47)
HCT VFR BLD AUTO: 33.1 % (ref 37–47)
HCT VFR BLD CALC: 32 % (ref 37–47)
HCV AB SER DONR QL: NORMAL
HGB BLD-MCNC: 9.8 G/DL (ref 12–16)
HGB BLD-MCNC: 9.9 G/DL (ref 12–16)
HGB BLDA-MCNC: 10.8 G/DL (ref 12–16)
HGB UR QL STRIP.AUTO: NEGATIVE
HOROWITZ INDEX BLD+IHG-RTO: 28 %
HYALINE CASTS UR QL AUTO: ABNORMAL /LPF
IMM GRANULOCYTES # BLD: 0.01 10*3/MM3 (ref 0–0.03)
IMM GRANULOCYTES # BLD: 0.01 10*3/MM3 (ref 0–0.03)
IMM GRANULOCYTES NFR BLD: 0.1 % (ref 0–0.5)
IMM GRANULOCYTES NFR BLD: 0.1 % (ref 0–0.5)
KETONES UR QL STRIP: ABNORMAL
L PNEUMO1 AG UR QL IA: NEGATIVE
LEUKOCYTE ESTERASE UR QL STRIP.AUTO: ABNORMAL
LV EF 2D ECHO EST: 65 %
LYMPHOCYTES # BLD AUTO: 0.77 10*3/MM3 (ref 1–3)
LYMPHOCYTES # BLD AUTO: 1.99 10*3/MM3 (ref 1–3)
LYMPHOCYTES NFR BLD AUTO: 21.3 % (ref 21–51)
LYMPHOCYTES NFR BLD AUTO: 7.8 % (ref 21–51)
M PNEUMO IGM SER QL: POSITIVE
MAGNESIUM SERPL-MCNC: 1.8 MG/DL (ref 1.7–2.6)
MCH RBC QN AUTO: 25.8 PG (ref 27–33)
MCH RBC QN AUTO: 26.1 PG (ref 27–33)
MCHC RBC AUTO-ENTMCNC: 29.6 G/DL (ref 33–37)
MCHC RBC AUTO-ENTMCNC: 30.4 G/DL (ref 33–37)
MCV RBC AUTO: 86 FL (ref 80–94)
MCV RBC AUTO: 87.1 FL (ref 80–94)
METHADONE UR QL SCN: NEGATIVE
METHGB BLD QL: 0.3 % (ref 0–3)
MODALITY: ABNORMAL
MONOCYTES # BLD AUTO: 0.1 10*3/MM3 (ref 0.1–0.9)
MONOCYTES # BLD AUTO: 1 10*3/MM3 (ref 0.1–0.9)
MONOCYTES NFR BLD AUTO: 1 % (ref 0–10)
MONOCYTES NFR BLD AUTO: 10.7 % (ref 0–10)
NEUTROPHILS # BLD AUTO: 6.3 10*3/MM3 (ref 1.4–6.5)
NEUTROPHILS # BLD AUTO: 9 10*3/MM3 (ref 1.4–6.5)
NEUTROPHILS NFR BLD AUTO: 67.6 % (ref 30–70)
NEUTROPHILS NFR BLD AUTO: 90.9 % (ref 30–70)
NITRITE UR QL STRIP: NEGATIVE
OPIATES UR QL: NEGATIVE
OSMOLALITY SERPL CALC.SUM OF ELEC: 276.6 MOSM/KG (ref 273–305)
OSMOLALITY SERPL CALC.SUM OF ELEC: 279.6 MOSM/KG (ref 273–305)
OXYCODONE UR QL SCN: NEGATIVE
OXYHGB MFR BLDV: 95 % (ref 85–100)
PCO2 BLDA: 36.3 MM HG (ref 35–45)
PCP UR QL SCN: NEGATIVE
PH BLDA: 7.35 PH UNITS (ref 7.35–7.45)
PH UR STRIP.AUTO: 6.5 [PH] (ref 5–8)
PHOSPHATE SERPL-MCNC: 3.4 MG/DL (ref 2.7–4.5)
PLATELET # BLD AUTO: 245 10*3/MM3 (ref 130–400)
PLATELET # BLD AUTO: 266 10*3/MM3 (ref 130–400)
PMV BLD AUTO: 10.9 FL (ref 6–10)
PMV BLD AUTO: 10.9 FL (ref 6–10)
PO2 BLDA: 89 MM HG (ref 80–100)
POTASSIUM BLD-SCNC: 4 MMOL/L (ref 3.5–5.3)
POTASSIUM BLD-SCNC: 4.1 MMOL/L (ref 3.5–5.3)
PROT SERPL-MCNC: 6.7 G/DL (ref 6–8)
PROT UR QL STRIP: NEGATIVE
RBC # BLD AUTO: 3.79 10*6/MM3 (ref 4.2–5.4)
RBC # BLD AUTO: 3.8 10*6/MM3 (ref 4.2–5.4)
RBC # UR: ABNORMAL /HPF
REF LAB TEST METHOD: ABNORMAL
SAO2 % BLDCOA: 96.1 % (ref 90–100)
SODIUM BLD-SCNC: 139 MMOL/L (ref 135–153)
SODIUM BLD-SCNC: 140 MMOL/L (ref 135–153)
SP GR UR STRIP: 1.01 (ref 1–1.03)
SQUAMOUS #/AREA URNS HPF: ABNORMAL /HPF
TROPONIN I SERPL-MCNC: <0.006 NG/ML
UROBILINOGEN UR QL STRIP: ABNORMAL
WBC NRBC COR # BLD: 9.33 10*3/MM3 (ref 4.5–12.5)
WBC NRBC COR # BLD: 9.9 10*3/MM3 (ref 4.5–12.5)
WBC UR QL AUTO: ABNORMAL /HPF

## 2017-08-11 PROCEDURE — 99291 CRITICAL CARE FIRST HOUR: CPT | Performed by: INTERNAL MEDICINE

## 2017-08-11 PROCEDURE — 80307 DRUG TEST PRSMV CHEM ANLYZR: CPT | Performed by: PHYSICIAN ASSISTANT

## 2017-08-11 PROCEDURE — 84100 ASSAY OF PHOSPHORUS: CPT | Performed by: NURSE PRACTITIONER

## 2017-08-11 PROCEDURE — 94799 UNLISTED PULMONARY SVC/PX: CPT

## 2017-08-11 PROCEDURE — 93010 ELECTROCARDIOGRAM REPORT: CPT | Performed by: INTERNAL MEDICINE

## 2017-08-11 PROCEDURE — 82375 ASSAY CARBOXYHB QUANT: CPT | Performed by: INTERNAL MEDICINE

## 2017-08-11 PROCEDURE — 82550 ASSAY OF CK (CPK): CPT | Performed by: INTERNAL MEDICINE

## 2017-08-11 PROCEDURE — 93306 TTE W/DOPPLER COMPLETE: CPT

## 2017-08-11 PROCEDURE — 25010000002 THIAMINE PER 100 MG: Performed by: INTERNAL MEDICINE

## 2017-08-11 PROCEDURE — 25010000002 AZITHROMYCIN: Performed by: HOSPITALIST

## 2017-08-11 PROCEDURE — 85025 COMPLETE CBC W/AUTO DIFF WBC: CPT | Performed by: PHYSICIAN ASSISTANT

## 2017-08-11 PROCEDURE — 99233 SBSQ HOSP IP/OBS HIGH 50: CPT | Performed by: INTERNAL MEDICINE

## 2017-08-11 PROCEDURE — 84484 ASSAY OF TROPONIN QUANT: CPT | Performed by: INTERNAL MEDICINE

## 2017-08-11 PROCEDURE — 82805 BLOOD GASES W/O2 SATURATION: CPT | Performed by: INTERNAL MEDICINE

## 2017-08-11 PROCEDURE — 83050 HGB METHEMOGLOBIN QUAN: CPT | Performed by: INTERNAL MEDICINE

## 2017-08-11 PROCEDURE — 80048 BASIC METABOLIC PNL TOTAL CA: CPT | Performed by: PHYSICIAN ASSISTANT

## 2017-08-11 PROCEDURE — 25010000002 METHYLPREDNISOLONE PER 40 MG: Performed by: PHYSICIAN ASSISTANT

## 2017-08-11 PROCEDURE — 81001 URINALYSIS AUTO W/SCOPE: CPT | Performed by: PHYSICIAN ASSISTANT

## 2017-08-11 PROCEDURE — 87086 URINE CULTURE/COLONY COUNT: CPT | Performed by: PHYSICIAN ASSISTANT

## 2017-08-11 PROCEDURE — 80074 ACUTE HEPATITIS PANEL: CPT | Performed by: INTERNAL MEDICINE

## 2017-08-11 PROCEDURE — 87899 AGENT NOS ASSAY W/OPTIC: CPT | Performed by: PHYSICIAN ASSISTANT

## 2017-08-11 PROCEDURE — 93005 ELECTROCARDIOGRAM TRACING: CPT | Performed by: INTERNAL MEDICINE

## 2017-08-11 PROCEDURE — 80053 COMPREHEN METABOLIC PANEL: CPT | Performed by: PHYSICIAN ASSISTANT

## 2017-08-11 PROCEDURE — 36600 WITHDRAWAL OF ARTERIAL BLOOD: CPT | Performed by: INTERNAL MEDICINE

## 2017-08-11 PROCEDURE — 93306 TTE W/DOPPLER COMPLETE: CPT | Performed by: INTERNAL MEDICINE

## 2017-08-11 PROCEDURE — 81025 URINE PREGNANCY TEST: CPT | Performed by: PHYSICIAN ASSISTANT

## 2017-08-11 PROCEDURE — 25010000002 HEPARIN (PORCINE) PER 1000 UNITS: Performed by: PHYSICIAN ASSISTANT

## 2017-08-11 RX ORDER — BUPRENORPHINE 2 MG/1
10 TABLET SUBLINGUAL DAILY
Status: CANCELLED | OUTPATIENT
Start: 2017-08-11

## 2017-08-11 RX ORDER — BUPRENORPHINE 2 MG/1
10.6 TABLET SUBLINGUAL DAILY
Status: CANCELLED | OUTPATIENT
Start: 2017-08-11

## 2017-08-11 RX ORDER — DOCUSATE SODIUM 100 MG/1
100 CAPSULE, LIQUID FILLED ORAL 2 TIMES DAILY
Status: DISCONTINUED | OUTPATIENT
Start: 2017-08-11 | End: 2017-08-15 | Stop reason: HOSPADM

## 2017-08-11 RX ORDER — ALBUTEROL SULFATE 2.5 MG/3ML
2.5 SOLUTION RESPIRATORY (INHALATION) EVERY 6 HOURS PRN
Status: CANCELLED | OUTPATIENT
Start: 2017-08-11

## 2017-08-11 RX ORDER — AMOXICILLIN AND CLAVULANATE POTASSIUM 500; 125 MG/1; MG/1
1 TABLET, FILM COATED ORAL EVERY 12 HOURS SCHEDULED
Status: DISCONTINUED | OUTPATIENT
Start: 2017-08-11 | End: 2017-08-15 | Stop reason: HOSPADM

## 2017-08-11 RX ORDER — BUPRENORPHINE 2 MG/1
8 TABLET SUBLINGUAL DAILY
Status: DISCONTINUED | OUTPATIENT
Start: 2017-08-11 | End: 2017-08-15 | Stop reason: HOSPADM

## 2017-08-11 RX ADMIN — BUDESONIDE 0.25 MG: 0.25 SUSPENSION RESPIRATORY (INHALATION) at 07:13

## 2017-08-11 RX ADMIN — DOCUSATE SODIUM 100 MG: 100 CAPSULE, LIQUID FILLED ORAL at 14:49

## 2017-08-11 RX ADMIN — SODIUM CHLORIDE 100 ML/HR: 9 INJECTION, SOLUTION INTRAVENOUS at 00:27

## 2017-08-11 RX ADMIN — BUDESONIDE 0.25 MG: 0.25 SUSPENSION RESPIRATORY (INHALATION) at 18:57

## 2017-08-11 RX ADMIN — HEPARIN SODIUM 5000 UNITS: 5000 INJECTION, SOLUTION INTRAVENOUS; SUBCUTANEOUS at 00:26

## 2017-08-11 RX ADMIN — MAGNESIUM GLUCONATE 500 MG ORAL TABLET 400 MG: 500 TABLET ORAL at 17:28

## 2017-08-11 RX ADMIN — BUPRENORPHINE HCL 8 MG: 2 TABLET SUBLINGUAL at 09:34

## 2017-08-11 RX ADMIN — HEPARIN SODIUM 5000 UNITS: 5000 INJECTION, SOLUTION INTRAVENOUS; SUBCUTANEOUS at 20:43

## 2017-08-11 RX ADMIN — AMOXICILLIN AND CLAVULANATE POTASSIUM 500 MG: 500; 125 TABLET, FILM COATED ORAL at 09:33

## 2017-08-11 RX ADMIN — IPRATROPIUM BROMIDE AND ALBUTEROL SULFATE 3 ML: .5; 3 SOLUTION RESPIRATORY (INHALATION) at 14:41

## 2017-08-11 RX ADMIN — IPRATROPIUM BROMIDE AND ALBUTEROL SULFATE 3 ML: .5; 3 SOLUTION RESPIRATORY (INHALATION) at 10:54

## 2017-08-11 RX ADMIN — IPRATROPIUM BROMIDE AND ALBUTEROL SULFATE 3 ML: .5; 3 SOLUTION RESPIRATORY (INHALATION) at 18:57

## 2017-08-11 RX ADMIN — IPRATROPIUM BROMIDE AND ALBUTEROL SULFATE 3 ML: .5; 3 SOLUTION RESPIRATORY (INHALATION) at 22:34

## 2017-08-11 RX ADMIN — METHYLPREDNISOLONE SODIUM SUCCINATE 40 MG: 40 INJECTION, POWDER, FOR SOLUTION INTRAMUSCULAR; INTRAVENOUS at 14:50

## 2017-08-11 RX ADMIN — FAMOTIDINE 20 MG: 10 INJECTION, SOLUTION INTRAVENOUS at 01:36

## 2017-08-11 RX ADMIN — METHYLPREDNISOLONE SODIUM SUCCINATE 40 MG: 40 INJECTION, POWDER, FOR SOLUTION INTRAMUSCULAR; INTRAVENOUS at 01:03

## 2017-08-11 RX ADMIN — THIAMINE HYDROCHLORIDE 125 ML/HR: 100 INJECTION, SOLUTION INTRAMUSCULAR; INTRAVENOUS at 09:35

## 2017-08-11 RX ADMIN — IPRATROPIUM BROMIDE AND ALBUTEROL SULFATE 3 ML: .5; 3 SOLUTION RESPIRATORY (INHALATION) at 07:07

## 2017-08-11 RX ADMIN — BUDESONIDE 0.25 MG: 0.25 SUSPENSION RESPIRATORY (INHALATION) at 00:59

## 2017-08-11 RX ADMIN — IPRATROPIUM BROMIDE AND ALBUTEROL SULFATE 3 ML: .5; 3 SOLUTION RESPIRATORY (INHALATION) at 03:01

## 2017-08-11 RX ADMIN — HEPARIN SODIUM 5000 UNITS: 5000 INJECTION, SOLUTION INTRAVENOUS; SUBCUTANEOUS at 09:34

## 2017-08-11 RX ADMIN — MAGNESIUM GLUCONATE 500 MG ORAL TABLET 400 MG: 500 TABLET ORAL at 14:49

## 2017-08-11 RX ADMIN — AZITHROMYCIN 500 MG: 500 INJECTION, POWDER, LYOPHILIZED, FOR SOLUTION INTRAVENOUS at 17:28

## 2017-08-11 RX ADMIN — FAMOTIDINE 20 MG: 10 INJECTION, SOLUTION INTRAVENOUS at 20:43

## 2017-08-11 RX ADMIN — AMOXICILLIN AND CLAVULANATE POTASSIUM 500 MG: 500; 125 TABLET, FILM COATED ORAL at 20:43

## 2017-08-11 RX ADMIN — FAMOTIDINE 20 MG: 10 INJECTION, SOLUTION INTRAVENOUS at 09:35

## 2017-08-11 NOTE — PLAN OF CARE
Problem: NPPV/CPAP (Adult)  Goal: Signs and Symptoms of Listed Potential Problems Will be Absent or Manageable (NPPV/CPAP)  Outcome: Ongoing (interventions implemented as appropriate)  Order placed for Bipap with settings of 14/4 RR 12 for nightime use and PRN during day if needed.  Bipap placed in room and patient placed on it for a few moments so that she could see how it felt.  Patient alert and talking with mother at this time and denies shortness of breath.  Informed patient that if she felt short of breath at any time, she could call and have the BIPAP put on.

## 2017-08-11 NOTE — PAYOR COMM NOTE
"  [facility]  NPI: [id number]    Utilization Review   Contact:America Cui RN, BSN  Phone: [phone]  Fax: [phone]    [facility]/Attn: rajendra Noriega  In critical care unit  REF: [id number]  DX: J45.901, J96.02, D72.829  Deborah Foley (22 y.o. Female)     Date of Birth Social Security Number Address Home Phone MRN    1995  [address] [facility] [address] [phone] [id number]    Zoroastrian Marital Status          None        Admission Date Admission Type Admitting Provider Attending Provider Department, Room/Bed    8/10/17 Emergency Joesph Esquivel MD Heinss, Karl F, MD [facility] CRITICAL CARE, CC01/1C    Discharge Date Discharge Disposition Discharge Destination                      Attending Provider: Joesph Esquivel MD     Allergies:  Shellfish-derived Products    Isolation:  Contact Drop   Infection:  Mycoplasma pneumonia (17)   Code Status:  FULL    Ht:  64\" (162.6 cm)   Wt:  107 lb (48.5 kg)    Admission Cmt:  None   Principal Problem:  None                Active Insurance as of 8/10/2017     Primary Coverage     Payor Plan Insurance Group Employer/Plan Group    [employer] [place] [employer]      Payor Plan Address Payor Plan Phone Number Effective From Effective To    [address] [address] [phone] [address]     [place] [address]       Subscriber Name Subscriber Birth Date Member ID       DEBORAH FOLEY 1995 [id number]                 Emergency Contacts      (Rel.) Home Phone Work Phone Mobile Phone    Michele Foley (Spouse) [phone] -- [phone]    Lisa Bright (Mother) [phone] -- --               History & Physical      Diimtrios Blackman DO at 8/10/2017  5:36 PM          Patient Identification:  Name:  Deborah Foley  Age:  22 y.o.  Sex:  female  :  1995  MRN:  [id number]   Visit Number:  [id number]  Primary Care Physician:  Vic Somers MD    I have seen the " patient in conjunction with Elvira Alfredo PA-C and I agree with the following statements. I have made any necessary changes below to reflect my findings.      Chief complaint:  Short of breath    History of presenting illness:  Ms. Rosas is a 21 yo female who has a known history of severe asthma. She presented to the ED at our facility today with progressively worsening shortness of breath for the past 2-3 days duration in spite of home treatments with inhalants.  She received a small amount of epinephrine in the ED in addition to ativan.  She denies cough, fever, and chills. She denies chest pain.  She reports that this feels like her usual asthma exacerbation and reports she has been hospitalized multiple times in her life with asthma exacerbations.  She does also report that she had a baby around 45 days ago at [facility]. She denies known complications. She states her asthma seemed improved throughout her pregnancy. She does not appear fluid overloaded.  Given acidotic pH and pCO2 of 40, she is being placed in the ICU for further evaluation and treatment. During evaluation she was 97% on 3 liters of oxygen via nasal cannula.  She has never established with a pulmonlogist as an outpatient, despite recommendations for close outpatient follow-up when she was discharged from our facility last year.  She denies known pulmonary diagnoses with the exception of asthma.     During my encounter, the patient was on her cell phone texting while I was attempting to explain the severity of her illness. I spent an extended period of time explaining to she and her father that she is at high risk for decompensation in the setting of her severe asthma exacerbation.         ---------------------------------------------------------------------------------------------------------------------   Review of Systems   Constitutional: Negative for activity change and appetite change.   HENT: Negative for  congestion and dental problem.    Eyes: Negative for discharge and itching.   Respiratory: Positive for cough, shortness of breath and wheezing. Negative for choking and chest tightness.    Cardiovascular: Negative for chest pain and leg swelling.   Gastrointestinal: Negative for abdominal distention, abdominal pain and anal bleeding.   Endocrine: Negative for cold intolerance and heat intolerance.   Genitourinary: Negative for difficulty urinating and dysuria.   Musculoskeletal: Negative for back pain and joint swelling.   Skin: Negative for color change and pallor.   Allergic/Immunologic: Negative for environmental allergies and food allergies.   Neurological: Negative for dizziness, light-headedness and headaches.   Hematological: Negative for adenopathy. Does not bruise/bleed easily.   Psychiatric/Behavioral: Negative for agitation and behavioral problems.      ---------------------------------------------------------------------------------------------------------------------   Past Medical History:   Diagnosis Date   • Asthma    • Seizures     Seizures as a child with last seizure around 10 years ago     Past Surgical History:   Procedure Laterality Date   • HERNIA REPAIR       Family History   Problem Relation Age of Onset   • COPD Mother      Social History     Social History   • Marital status:      Spouse name: N/A   • Number of children: N/A   • Years of education: N/A     Social History Main Topics   • Smoking status: Never Smoker   • Smokeless tobacco: None   • Alcohol use No   • Drug use: No   • Sexual activity: Not Asked     Other Topics Concern   • None     Social History Narrative   • None     ---------------------------------------------------------------------------------------------------------------------   Allergies:  Shellfish-derived products  ---------------------------------------------------------------------------------------------------------------------   Prior to Admission  Medications     None        Hospital Scheduled Meds:        ---------------------------------------------------------------------------------------------------------------------   Vital Signs:  Temp:  [98 °F (36.7 °C)] 98 °F (36.7 °C)  Heart Rate:  [126-137] 130  Resp:  [22] 22  BP: (114-127)/(65-80) 127/72  Last 3 weights    08/10/17  1151   Weight: 106 lb (48.1 kg)     Body mass index is 18.19 kg/(m^2).  ---------------------------------------------------------------------------------------------------------------------   Physical Exam:  Constitutional:  Very thin appearing.  Flat affect.      HENT:  Head: Normocephalic and atraumatic.  Mouth:  Moist mucous membranes.    Eyes:  Conjunctivae and EOM are normal.  Pupils are equal, round, and reactive to light.  No scleral icterus.  Neck:  Neck supple.  No JVD present.    Cardiovascular:  Tachycardic. No murmur, rubs or gallops appreciated.   Pulmonary/Chest: Respirations regular, even and unlabored. Diffuse wheezing throughout with poor air movement.    Abdominal:  Soft.  Nontender. No guarding or rebound tenderness. Bowel sounds present x 4.    Musculoskeletal:  No edema, no tenderness, and no deformity.  No red or swollen joints anywhere.    Neurological:  Alert and oriented to person, place, and time.  No cranial nerve deficit.  No tongue deviation.  No facial droop.  No slurred speech.   Skin:  Skin is warm and dry.  No rash noted.  No pallor.   Psychiatric:  Normal mood and affect.  Behavior is normal.  Judgment and thought content normal.   Peripheral vascular:  No edema and strong pulses on all 4 extremities.  ---------------------------------------------------------------------------------------------------------------------  EKG:  PENDING      Telemetry:  Sinus tach in the 110s-130s  I have personally looked at the telemetry strips.  ---------------------------------------------------------------------------------------------------------------------      Results from last 7 days  Lab Units 08/10/17  1238   LACTATE mmol/L 2.2*   WBC 10*3/mm3 14.52*   HEMOGLOBIN g/dL 12.9   HEMATOCRIT % 42.1   MCV fL 85.6   MCHC g/dL 30.6*   PLATELETS 10*3/mm3 300       Results from last 7 days  Lab Units 08/10/17  1603   PH, ARTERIAL pH units 7.346*   PO2 ART mm Hg 69.4*   PCO2, ARTERIAL mm Hg 40.6   HCO3 ART mmol/L 21.7*       Results from last 7 days  Lab Units 08/10/17  1238   SODIUM mmol/L 140   POTASSIUM mmol/L 3.8   CHLORIDE mmol/L 104   CO2 mmol/L 26.6   BUN mg/dL 9   CREATININE mg/dL 0.71   EGFR IF NONAFRICN AM mL/min/1.73 102   CALCIUM mg/dL 10.3*   GLUCOSE mg/dL 106   ALBUMIN g/dL 5.30*   BILIRUBIN mg/dL 0.4   ALK PHOS U/L 139*   AST (SGOT) U/L 34*   ALT (SGPT) U/L 21   Estimated Creatinine Clearance: 93.6 mL/min (by C-G formula based on Cr of 0.71).  No results found for: AMMONIA          Lab Results   Component Value Date    HGBA1C 5.2 06/03/2016     No results found for: TSH, FREET4  Lab Results   Component Value Date    PREGTESTUR Negative 06/02/2016     Pain Management Panel     Pain Management Panel Latest Ref Rng & Units 6/2/2016    AMPHETAMINES SCREEN, URINE Negative Negative    BARBITURATES SCREEN Negative Negative    BENZODIAZEPINE SCREEN, URINE Negative Negative    COCAINE SCREEN, URINE Negative Negative    METHADONE SCREEN, URINE Negative Negative                        ---------------------------------------------------------------------------------------------------------------------  Imaging Results (last 7 days)     Procedure Component Value Units Date/Time    XR Chest 1 View [[id number]] Collected:  08/10/17 1248     Updated:  08/10/17 1308    Narrative:       XR CHEST 1 VW-     CLINICAL INDICATION: Simple sepsis protocol          COMPARISON: 6/3/2016      TECHNIQUE: Single frontal view of the chest.     FINDINGS:     There is no focal alveolar infiltrate or effusion.  The cardiac silhouette is normal. The pulmonary vasculature is  unremarkable.  There is  no evidence of an acute osseous abnormality.   There are no suspicious-appearing parenchymal soft tissue nodules.            Impression:       No evidence of active or acute cardiopulmonary disease on today's chest  radiograph.         This report was finalized on 8/10/2017 12:48 PM by Dr. Juan J Solo MD.             Cultures:   PENDING      I have personally reviewed the radiology images and read the final radiology report.  ---------------------------------------------------------------------------------------------------------------------  Assessment and Plan:    -Acute severe exacerbation of asthma with acute hypercapnic respiratory failure:  IV epinephrine and Ativan were given in ED.  Start IV Solumedrol 40mg IV BID. Albuterol inhalants in addition to budesonide inhalants have been ordered.  IV Pepcid has also ordered. Echocardiogram ordered given post-partum state with dyspnea. Will monitor closely in the CCU as pt is at high risk for decompensation. Consult pulmonology.     -Mildly elevated lactic acid:  Possibly secondary to asthma exacerbation in addition to albuterol. Resolved on repeat lab draw. Will gently hydrate.    -Leukocytosis:  Possibly reactive. Blood cultures have been obtained. Will obtain sputum culture and rule out atypical pneumonias with hx of mycoplasma pneumonia. No evidence of pneumonia on CXR. She did receive Rocephin and Azithromcyin in the ED. Urinalysis is pending at present.  Start gentle hydration.     -Tachycardia: Likely 2/2 above. Check EKG and CE's.     -Post-partum state:  Echocardiogram ordered in the setting of dyspnea, although pt does not appear volume overloaded.     -Mildly elevated liver enzymes: Check hepatitis panel. Repeat CMP in the AM.     -DVT PPX: SQ heparin    Pt is at high risk 2/2 severe asthma exacerbation at high risk for decompensation.       Elvira Alfredo PA-C  08/10/17  5:37  PM  ---------------------------------------------------------------------------------------------------------------------     I have reviewed the notes, assessments, and/or procedures performed by Elvira Alfredo PA-C. I concur with her/his documentation of Deborah Huangugherty.  Dimitrios Balckman D.O.      Electronically signed by Dimitrios Blackman DO at 8/10/2017  7:05 PM           Emergency Department Notes      Jeremy Olmstead MD at 8/10/2017 12:07 PM          Subjective   HPI Comments: Long history of asthma, this acute exacerbation started yesterday    Patient is a 22 y.o. female presenting with wheezing.   History provided by:  Patient   used: No    Wheezing   Severity:  Severe  Severity compared to prior episodes:  Similar  Onset quality:  Sudden  Duration:  1 day  Timing:  Constant  Progression:  Worsening  Chronicity:  Chronic  Context: strong odors    Relieved by:  Nothing  Worsened by:  Activity  Ineffective treatments:  Beta-agonist inhaler and ipratropium inhaler  Associated symptoms: chest tightness, cough and shortness of breath    Associated symptoms: no chest pain and no fever        Review of Systems   Constitutional: Negative.  Negative for fever.   HENT: Negative.    Respiratory: Positive for cough, chest tightness, shortness of breath and wheezing.    Cardiovascular: Negative.  Negative for chest pain.   Gastrointestinal: Negative.  Negative for abdominal pain.   Endocrine: Negative.    Genitourinary: Negative.  Negative for dysuria.   Skin: Negative.    Neurological: Negative.    Psychiatric/Behavioral: Negative.    All other systems reviewed and are negative.      No past medical history on file.    Allergies   Allergen Reactions   • Shellfish-Derived Products        No past surgical history on file.    No family history on file.    Social History     Social History   • Marital status:      Spouse name: N/A   • Number of children: N/A   • Years of  education: N/A     Social History Main Topics   • Smoking status: Not on file   • Smokeless tobacco: Not on file   • Alcohol use Not on file   • Drug use: Not on file   • Sexual activity: Not on file     Other Topics Concern   • Not on file     Social History Narrative           Objective   Physical Exam   Constitutional: She is oriented to person, place, and time. She appears well-developed and well-nourished. No distress.   HENT:   Head: Normocephalic and atraumatic.   Right Ear: External ear normal.   Left Ear: External ear normal.   Nose: Nose normal.   Mouth/Throat: Oropharynx is clear and moist.   Eyes: Conjunctivae and EOM are normal. Pupils are equal, round, and reactive to light.   Neck: Normal range of motion. Neck supple. No JVD present. No tracheal deviation present.   Cardiovascular: Normal rate, regular rhythm and normal heart sounds.    No murmur heard.  Pulmonary/Chest: Effort normal. No respiratory distress. She has wheezes.   Abdominal: Soft. Bowel sounds are normal. There is no tenderness.   Musculoskeletal: Normal range of motion. She exhibits no edema or deformity.   Neurological: She is alert and oriented to person, place, and time. No cranial nerve deficit.   Skin: Skin is warm and dry. No rash noted. She is not diaphoretic. No erythema. No pallor.   Psychiatric: She has a normal mood and affect. Her behavior is normal. Thought content normal.   Nursing note and vitals reviewed.      Procedures  Lab Results (last 24 hours)     Procedure Component Value Units Date/Time    CBC & Differential [[id number]] Collected:  08/10/17 1238    Specimen:  Blood Updated:  08/10/17 1259    Narrative:       The following orders were created for panel order CBC & Differential.  Procedure                               Abnormality         Status                     ---------                               -----------         ------                     CBC Auto Differential[[id number]]         Abnormal            Final  result                 Please view results for these tests on the individual orders.    Comprehensive Metabolic Panel [[id number]]  (Abnormal) Collected:  08/10/17 1238    Specimen:  Blood from Arm, Left Updated:  08/10/17 1317     Glucose 106 mg/dL      BUN 9 mg/dL      Creatinine 0.71 mg/dL      Sodium 140 mmol/L      Potassium 3.8 mmol/L      Chloride 104 mmol/L      CO2 26.6 mmol/L      Calcium 10.3 (H) mg/dL      Total Protein 9.3 (H) g/dL      Albumin 5.30 (H) g/dL      ALT (SGPT) 21 U/L      AST (SGOT) 34 (H) U/L      Alkaline Phosphatase 139 (H) U/L       Note New Reference Ranges        Total Bilirubin 0.4 mg/dL      eGFR Non African Amer 102 mL/min/1.73      Globulin 4.0 gm/dL      A/G Ratio 1.3 (L) g/dL      BUN/Creatinine Ratio 12.7     Anion Gap 9.4 mmol/L     Lactic Acid, Plasma [[id number]]  (Abnormal) Collected:  08/10/17 1238    Specimen:  Blood from Arm, Left Updated:  08/10/17 1313     Lactate 2.2 (C) mmol/L     Blood Culture [[id number]] Collected:  08/10/17 1238    Specimen:  Blood from Arm, Left Updated:  08/10/17 1415    CBC Auto Differential [[id number]]  (Abnormal) Collected:  08/10/17 1238    Specimen:  Blood from Arm, Left Updated:  08/10/17 1259     WBC 14.52 (H) 10*3/mm3      RBC 4.92 10*6/mm3      Hemoglobin 12.9 g/dL      Hematocrit 42.1 %      MCV 85.6 fL      MCH 26.2 (L) pg      MCHC 30.6 (L) g/dL      RDW 14.3 %      RDW-SD 45.0 fl      MPV 11.1 (H) fL      Platelets 300 10*3/mm3      Neutrophil % 83.3 (H) %      Lymphocyte % 6.7 (L) %      Monocyte % 2.5 %      Eosinophil % 7.0 (H) %      Basophil % 0.2 %      Immature Grans % 0.3 %      Neutrophils, Absolute 12.09 (H) 10*3/mm3      Lymphocytes, Absolute 0.98 (L) 10*3/mm3      Monocytes, Absolute 0.37 10*3/mm3      Eosinophils, Absolute 1.01 (H) 10*3/mm3      Basophils, Absolute 0.03 10*3/mm3      Immature Grans, Absolute 0.04 (H) 10*3/mm3     Blood Culture [[id number]] Collected:  08/10/17 1332    Specimen:  Blood from Hand, Left Updated:   "08/10/17 1415    Blood Gas, Arterial With Co-Ox [[id number]]  (Abnormal) Collected:  08/10/17 1603    Specimen:  Arterial Blood Updated:  08/10/17 1607     Site Arterial: left brachial     Gary's Test N/A     pH, Arterial 7.346 (L) pH units      pCO2, Arterial 40.6 mm Hg      pO2, Arterial 69.4 (L) mm Hg      HCO3, Arterial 21.7 (L) mmol/L      Base Excess, Arterial -3.7 mmol/L      O2 Saturation, Arterial 91.7 %      Hemoglobin, Blood Gas 12.4 g/dL      Hematocrit, Blood Gas 36.0 (L) %      Oxyhemoglobin 90.0 %      Methemoglobin 0.50 %      Carboxyhemoglobin 1.4 %      A-a Gradiant 25.4 mmHg      Temperature 98.6 C      Barometric Pressure for Blood Gas 730 mmHg      Modality Room air     FIO2 21 %         XR Chest 1 View   Final Result   No evidence of active or acute cardiopulmonary disease on today's chest   radiograph.           This report was finalized on 8/10/2017 12:48 PM by Dr. Juan J Solo MD.                  ED Course  ED Course      Patient still wheezing with some labored breathing.  Discussed Dr. Knight will admit to CCU            MDM    Final diagnoses:   Acute severe exacerbation of asthma            Jeremy Olmstead MD  08/10/17 1717       Electronically signed by Jeremy Olmstead MD at 8/10/2017  5:17 PM      Jocelyn Dominguez RN at 8/10/2017 12:10 PM          Provider made aware of positive simple sepsis screen triage assessment     Jocelyn Dominguez RN  08/10/17 1216       Electronically signed by Jocelyn Dominguez RN at 8/10/2017 12:16 PM      Mindi Baldwin RN at 8/10/2017  7:11 PM          Patient sitting up in bed. No acute distress noted. Family at bedside. Awaiting bed assignment.     Mindi Baldwin RN  08/10/17 1912       Electronically signed by Mindi Baldwin RN at 8/10/2017  7:12 PM      Mindi Baldwin RN at 8/10/2017  8:54 PM          Patient reports \"cant breath\". 02 sats 97%and >. Patient extremely tight/wheezing. Dr. Perales notified. New orders noted.   "   Mindi Baldwin RN  08/10/17 2055       Electronically signed by Mindi Baldwin RN at 8/10/2017  8:55 PM        Ventilator/Non-Invasive Ventilation Settings     Start     Ordered    17  . BIPAP; IPAP: 14; EPAP: 4  Until Discontinued     Question Answer Comment   . BIPAP    IPAP 14    EPAP 4        17 0818             Deborah Rosas #[id number] (Acct:[id number] [facility]# [id number])  (:1995 22 y.o. F)   PCP: DESHAWN SANTOS ([phone])  1C (IP Bed Assigned)           Patient Care Timeline (8/10/2017 11:48 to 8/10/2017 23:42:38)        8/10/2017 Event    User     11:47:52 Patient expected in ED  Jocelyn Dominguez RN     11:48 Peripheral IV Line - Single Lumen 08/10/17 1148 median cubital vein (antecubital fossa), right 20 gauge Placed Placement Date/Time: 08/10/17 1148   Placed by External Staff?: EMS  Location: median cubital vein (antecubital fossa), right  Device/Lot Number: over-the-needle catheter system  Gauge/Length: 20 gauge Jocelyn Dominguez RN     11:48 Acuity/Destination Acuity/Destination - Patient Acuity: 2 ; ED Destination: ED Beds Jocelyn Dominguez RN     11:48 HPI (Adult) HPI (Adult) - Stated Reason for Visit: Pt reports shortness of breath, hx of asthma ; History Obtained From: EMS; patient ; Onset of Symptoms: worsening ; Duration (Days): 2 ; Medications/Treatments Administered by EMS Prior to Presentation:  (solu medrol 40mg, duo neb) ; Medications/Treatments Prior to Arrival:  (Pt reports using ventolin inhaler proir to arrival of EMS) Jocelyn Dominguez RN     11:48:24 Acuity 2 Selected  Jocelyn Dominguez RN     11:48:45 Trigger for Triage Start  Jocelyn Dominguez RN     11:48:45 Triage Started  Jocelyn Dominguez RN     11:48:55 Patient arrived in ED  Jo Ann Baldwin RN     11:48:56 Patient roomed in ED To room 117 Jo Ann Baldwin RN     11:50 Travel and Exposure Screening Travel Screening - Traveled outside the [place] in the last month: No  "  Exposure Screening - Contact with someone with a communicable disease in the last month: No Monique Tripathi Rep     11:51 Vital Signs Vital Signs - Temp: 98 °F (36.7 °C) ; Temp src: Oral ; Heart Rate Source: Monitor ; Resp: 22 ; Resp Rate Source: Visual ; BP: 118/80 (Device Time: :16) ; BP Location: Left arm ; BP Method: Automatic ; Patient Position: Sitting   Oxygen Therapy - Pulse Oximetry Type: Continuous ; O2 Device: room air   Vitals Timer - Restart Vitals Timer: Yes   Height and Weight - Height: 64\" (162.6 cm) ; Height Method: Stated ; Weight: 106 lb (48.1 kg) ; Weight Method: Stated Jocelyn Dominguez RN     11:51 Custom Formula Data Vital Signs - BMI (Calculated): 18.2   Measurements (Adult/Pediatric) - BMI (kg/m2): 18.23   Ideal Body Weight (IBW) - Ideal Body Weight (IBW), Female: 55.4 ; % Ideal Body Weight: 86.96   Adult IBW/VT Calculations - IBW/kg (Calculated) : 54.7 ; Low Range Vt 6mL/kg : 328.2 mL/kg ; Adult Moderate Range Vt 8mL/kg : 437.6 mL/kg ; Adult High Range Vt 10mL/k mL/kg   Other flowsheet entries - Last MAP (calculated): 92.67 ; BSA (Calculated - sq m): 1.49 sq meters ; Weight in (lb) to have BMI = 25: 145.3 Jocelyn Dominguez RN     11:51 Devices Testing Template Device Data - Heart Rate:  126 (Device Time: :16) ; SpO2: 94 % (Device Time: :16) ; Noninvasive MAP (mmHg): 89 (Device Time: :16) Jocelyn Dominguez RN     11:52 Travel and Exposure Screening Travel Screening - Traveled outside the [place] in the last month: No   Exposure Screening - Contact with someone with a communicable disease in the last month: No Jocelyn Dominguez RN     11:52 Pain (Adult) Pain (Adult) - Presence Of Pain: denies pain/discomfort Jocelyn Dominguez RN     11:53 Acuity/Destination Acuity/Destination - Patient Acuity: 2 ; ED Destination: ED Beds Jocelyn Dominguez RN     11:53 Triage Sepsis Screen Sepsis Screen (1=Yes, 0=No) - Documented or Suspected Infection:  Yes ; Acutely " Altered Mental Status: No ; Temp <96.8 OR >100.9: No ; Heart Rate >90:  Yes ; Respiratory Rate >20:  Yes ; SBP <90 : No ; MAP <65: No ; Blood Glucose >140 (mg/dL) - Not diabetic: Unknown ; WBC <4 or >12 (10*3/mm3): Unknown ; Bands > 10%: Unknown ; Abnormals (in addition to infection question): 2 ; Result of current screen is::  Positive: Simple ; Previous screen positive?: yes: do not screen (Response filed to Inpatient Sepsis Flowsheet)          Nursing Assessments    Expand  Hide           Start       Ordered       08/11/17 0734  [facility]ursing Communication [facility]   Comments: Please get her records from al...    08/11/17 0733       08/11/17 0000  Vital Signs Every Hour and Per Hospital Policy Based on Patient Condition Every Hour      08/10/17 2357       08/11/17 0000  Intake and Output Every Hour      08/10/17 2357       08/10/17 2358  Cardiac Monitoring Continuous      08/10/17 2357       08/10/17 2358  Continuous Pulse Oximetry Continuous      08/10/17 2357       08/10/17 2358  Use Mobility Guidelines for Advancement of Activity Continuous      08/10/17 2357       08/10/17 2358  Daily Weights Daily      08/10/17 2357       08/10/17 2358  Saline Lock & Maintain IV Access (Insert and Maintain IV) Continuous      08/10/17 2357       08/10/17 1213  Vital Signs (Simple Sepsis Order Panel) Every 30 Minutes      08/10/17 1213         Nursing Activities and Treatment    Hide           Start       Ordered       08/10/17 2358  Strict Bed Rest Until Discontinued      08/10/17 2357         Nursing Orders Awaiting Completion    Hide           Start       Ordered       08/10/17 1213  Undress and Gown (Simple Sepsis Order Panel) Once    Complete    08/10/17 1213       08/10/17 1213  Insert peripheral IV (Simple Sepsis Order Panel) Once    Complete    08/10/17 1213         Lab Orders   (Through next 24h)    Expand  Hide           Start       Ordered       08/11/17 0039   Urine Culture Once      08/11/17 0038       08/10/17 2358  Basic Metabolic Panel Daily      08/10/17 2357       08/10/17 2358  CBC & Differential Daily      08/10/17 2357       08/10/17 2358  CK Now Then Every 6 Hours      08/10/17 2357       08/10/17 2358  Troponin Now Then Every 6 Hours      08/10/17 2357         Cancel an individual lab collection by clicking the appropriate Discontinue hyperlink. Use the Discontinue link on the right-hand side of the screen associated with the specific lab that was not collected. Do not use the Discontinue link next to the name and frequency of the original order.     Lab Orders (Through next 8h) Comment              Last edited by  on  at        Start       Ordered       08/11/17 0039  Urine Culture Once        08/11/17 0038       08/10/17 2358  CK Now Then Every 6 Hours       Start Status        08/11/17 1158 Needs to be Collected Details           08/10/17 2357       08/10/17 2358  Troponin Now Then Every 6 Hours       Start Status        08/11/17 1158 Needs to be Collected Details           08/10/17 2357         Dietary Orders    Expand  Hide           Start       Ordered       08/10/17 2358  Diet Regular Diet Effective Now      08/10/17 2357         Consult Orders    Expand  Hide           Start       Ordered       08/10/17 2358  Inpatient Consult to Pulmonology Once    Complete Specialty: Pulmonary Disease Provider: Marino Meehan MD    08/10/17 2357       08/10/17 1642  Hospitalist (on-call MD unless specified) (Common Consults (COR)) Once    Complete Specialty: Hospitalist Provider: Joesph Esquivel MD    08/10/17 1641         Respiratory Orders    Expand  Hide           Start       Ordered       08/11/17 0819  . BIPAP; IPAP: 14; EPAP: 4 Until Discontinued      08/11/17 0818       Unscheduled  Oxygen Therapy- Nasal Cannula; 2 LPM; Titrate for SPO2: equal to or greater than, 92% (Simple Sepsis Order Panel) As Needed      08/10/17 1213         Cardiographics Orders     Expand  Hide           Start       Ordered       08/10/17 2358  Echocardiogram Doppler Once   Comments: Recently had a child in 45 day...    08/10/17 2357           Admission, Transfer, Discharge Orders  Comment  Collapse  Hide              Last edited by  on  at        Start       Ordered       08/10/17 1901  Inpatient Admission Once       Transfer Service: Medicine         Question Answer Comment     Level of Care Critical Care      Diagnosis Respiratory failure      Admitting Physician ALFREDO STAHL      Certification I certify that inpatient hospital services are medically necessary for greater than 2 midnights.            08/10/17 1901       08/10/17 1709  Initiate Observation Status Once       Transfer Service: Medicine         Question Answer Comment     Level of Care Critical Care      Diagnosis Acute severe exacerbation of asthma      Admitting Physician ALFREDO STAHL            08/10/17 1717       08/10/17 1642  ICU / CCU Bed Request (Patient Movement Orders) Once       Transfer Service: Pulmonology         Question Answer Comment     Level of Care Critical Care      Admitting Physician ALFREDO STAHL      Attending Physician ALFREDO STAHL      Patient Class Inpatient            08/10/17 1641           Nurse Snapshot Configuration  Collapse All  Expand All  Hide All  Show All         Code Status Orders           Start       Ordered       08/10/17 2358  Full Code Continuous        08/10/17 2357           [id number]   08/11/17 0600--[id number]   130--127/72--97   08/10/17 1502-- 129--127/68-- 89    Scheduled Meds Sorted by Name for Deborah Rosas as of 8/9/17 through 8/11/17       1 Day 3 Days 7 Days 10 Days < Today >    Legend:                           Inactive     Active     Other Encounter    Linked               Medications 08/09/17 08/10/17 08/11/17   albuterol (PROVENTIL) nebulizer solution 0.083% 2.5 mg/3mL  Dose: 2.5 mg Freq: Once Route: NEBULIZATION  Start: 08/10/17 2118  End: 08/10/17 2132     2132               albuterol (PROVENTIL) nebulizer solution 0.083% 2.5 mg/3mL  Dose: 2.5 mg Freq: Every 6 Hours - RT Route: NEBULIZATION  Start: 08/11/17 0100 End: 08/11/17 0220      (0058) [C]     0220-D/C'd        amoxicillin-clavulanate (AUGMENTIN) 500-125 MG per tablet 500 mg  Dose: 1 tablet Freq: Every 12 Hours Scheduled Route: PO  Indications of Use: PNEUMONIA  Start: 08/11/17 0900      0900     2100            AZITHROMYCIN 500 MG/250 ML 0.9% NS IVPB (MBP)  Dose: 500 mg Freq: Every 24 Hours Route: IV  Indications of Use: PNEUMONIA  Start: 08/11/17 1600      1600              AZITHROMYCIN 500 MG/250 ML 0.9% NS IVPB (MBP)  Dose: 500 mg Freq: Once Route: IV  Indications of Use: UPPER RESPIRATORY TRACT INFECTION  Last Dose: Stopped (08/10/17 1731)  Start: 08/10/17 1559 End: 08/10/17 1731     1631     1731             budesonide (PULMICORT) nebulizer solution 0.25 mg  Dose: 0.25 mg Freq: 2 Times Daily - RT Route: NEBULIZATION  Start: 08/11/17 0100    Admin Instructions:   Do not shake.  Protect from light.      0059     0713     2130          buprenorphine (SUBUTEX) SL tablet 8 mg  Dose: 8 mg Freq: Daily Route: SL  Start: 08/11/17 0900      0900              buprenorphine (SUBUTEX) SL tablet 8 mg  Dose: 8 mg Freq: Once Route: SL  Start: 08/10/17 2106 End: 08/10/17 2123     2123               cefTRIAXone (ROCEPHIN) 1 g/100 mL 0.9% NS (MBP)  Dose: 1 g Freq: Every 24 Hours Route: IV  Indications of Use: UPPER RESPIRATORY TRACT INFECTION  Start: 08/11/17 1400 End: 08/11/17 0816    Admin Instructions:   Activate vial before using.      0816-D/C'd          cefTRIAXone (ROCEPHIN) 1 g/100 mL 0.9% NS (MBP)  Dose: 1 g Freq: Once Route: IV  Indications of Use: UPPER RESPIRATORY TRACT INFECTION  Last Dose: Stopped (08/10/17 1508)  Start: 08/10/17 1433 End: 08/10/17 1508    Admin Instructions:   Activate vial before using.     1438     1508             EPINEPHrine injection 0.3 mg  Dose: 0.3 mg Freq: Once  Route: SC  Start: 08/10/17 1303 End: 08/10/17 1321     1321               famotidine (PEPCID) injection 20 mg  Dose: 20 mg Freq: Every 12 Hours Scheduled Route: IV  Start: 08/11/17 0100    Admin Instructions:   Dilute to 10 mL total volume and give IV push over 2 minutes.  Dilute to 10 mL total volume with 0.9% NaCl and give IV push over 2 minutes.      0136     0900     2100          heparin (porcine) 5000 UNIT/ML injection 5,000 Units  Dose: 5,000 Units Freq: Every 12 Hours Scheduled Route: SC  Start: 08/11/17 0100      0026     0900     2100          ipratropium-albuterol (DUO-NEB) nebulizer solution 3 mL  Dose: 3 mL Freq: Every 4 Hours - RT Route: NEBULIZATION  Start: 08/10/17 1930     1900     2322          0301     0707     1130       1530     1930     2330          ipratropium-albuterol (DUO-NEB) nebulizer solution 3 mL  Dose: 3 mL Freq: Once Route: NEBULIZATION  Start: 08/10/17 1558 End: 08/10/17 1651     1651               ipratropium-albuterol (DUO-NEB) nebulizer solution 3 mL  Dose: 3 mL Freq: Once Route: NEBULIZATION  Start: 08/10/17 1215 End: 08/10/17 1221     1221               LORazepam (ATIVAN) injection 1 mg  Dose: 1 mg Freq: Once Route: IV  Start: 08/10/17 1303 End: 08/10/17 1318     1318               methylPREDNISolone sodium succinate (SOLU-Medrol) injection 40 mg  Dose: 40 mg Freq: Every 12 Hours Route: IV  Start: 08/11/17 0100      0103     1300            multiple vitamin (M.V.I. Adult) 10 mL, thiamine (B-1) 100 mg, folic acid 1 mg in lactated ringers 1,000 mL infusion  Dose: 125 mL/hr Freq: Daily Route: IV  Start: 08/11/17 0900      0900              Pharmacy Meds to Bed Consult  Freq: Daily Route: XX  Start: 08/11/17 0800    Admin Instructions:   Patient is enrolled in our Meds to Beds service.  Hours of service is 8 am to 6 pm.  Must have prescriptions by 5:30 pm.  Contact Pharmacy Patient Services at ext #[phone] when physician completes discharge reconciliation.  Any discharges after hours  will be looked at the next morning.        0800              predniSONE (DELTASONE) tablet 40 mg  Dose: 40 mg Freq: Daily With Breakfast Route: PO  Start: 08/11/17 0800 End: 08/11/17 0807    Admin Instructions:   Take with food.      0800     0807-D/C'd        sodium chloride 0.9 % bolus 500 mL  Dose: 500 mL Freq: Once Route: IV  Last Dose: Stopped (08/10/17 2256)  Start: 08/10/17 2151 End: 08/10/17 2256     2159     2256             sodium chloride 0.9 % bolus 500 mL  Dose: 500 mL Freq: Once Route: IV  Last Dose: Stopped (08/10/17 1538)  Start: 08/10/17 1433 End: 08/10/17 1538     1437     1538             Medications 08/09/17 08/10/17 08/11/17         Continuous Meds Sorted by Name for Deborah Rosas as of 8/9/17 through 8/11/17      Legend:                           Inactive     Active     Other Encounter    Linked               Medications 08/09/17 08/10/17 08/11/17   sodium chloride 0.9 % infusion  Rate: 125 mL/hr Freq: Continuous Route: IV  Last Dose: Stopped (08/11/17 0033)  Start: 08/10/17 2151 End: 08/11/17 0816 2151            0033     0816-D/C'd        sodium chloride 0.9 % infusion  Rate: 125 mL/hr Freq: Continuous Route: IV  Start: 08/10/17 1958 End: 08/10/17 2319     2319-D/C'd  (2350) [C]               sodium chloride 0.9 % infusion  Rate: 125 mL/hr Freq: Continuous Route: IV  Last Dose: Stopped (08/10/17 2320)  Start: 08/10/17 1851 End: 08/11/17 0001     1923     2320          0001-D/C'd          sodium chloride 0.9 % infusion  Rate: 100 mL/hr Freq: Continuous Route: IV  Last Dose: 100 mL/hr (08/11/17 0027)  Start: 08/11/17 0030 End: 08/11/17 0816 0027     0816-D/C'd              PRN Meds Sorted by Name for Deborah Rosas as of 8/9/17 through 8/11/17      Legend:         Inactive     Active     Other Encounter    Linked               Medications 08/09/17 08/10/17 08/11/17   albuterol (PROVENTIL) nebulizer solution 0.083% 2.5 mg/3mL  Dose: 2.5 mg Freq: Every 6 Hours PRN Route:  NEBULIZATION  PRN Reasons: Wheezing,Shortness of Air  Start: 08/10/17 2357         pneumococcal polysaccharide 23-valent (PNEUMOVAX-23) vaccine 0.5 mL  Dose: 0.5 mL Freq: During Hospitalization Route: IM  PRN Reason: Immunization  Start: 08/11/17 0024    Admin Instructions:   **Do Not Administer if Temperature Greater Than 102F & Notify Pharmacy**   Pneumococcal & Influenza Vaccines May Be Given At The Same Time in SEPARATE Injections           sodium chloride 0.9 % flush 1-10 mL  Dose: 1-10 mL Freq: As Needed Route: IV  PRN Reason: Line Care  Start: 08/10/17 2357         sodium chloride 0.9 % flush 10 mL  Dose: 10 mL Freq: As Needed Route: IV  PRN Reason: Line Care  Start: 08/10/17 1212                       (Excludes PO medications unless noted)            [X] Select Day, One:              [X] Episode Day 1, One:                  [X] ACUTE, Both:                      [X] Asthma, Both:                          [X] 1-3h after treatment with, >= One:                              [X] Short-acting beta-agonist >= 3 doses (>= 2 doses in pregnancy)                              [X] OP treatment (includes PO) >= 2d                          [X] Persistent wheezing, >= One:                              [ ] Finding, Both:                                  [X] Respiratory status, >= One:                                      [X] O2 sat 91-95%(0.91-0.95)                              [X] Heart rate > 120/min, sustained                      [X] Intervention, All:                          [X] Short-acting beta-agonist >= 6x/24h                          [X] Corticosteroid (includes PO)                          [X] Oxygenation, One:                              [X] O2 sat >= 91%(0.91) or baseline                          [X] Oximetry or blood gas     Version: InterQual® 2016.3  InterQual® and Care[employer]®  © 2016 [employer] and/or one of its subsidiaries.  All Rights Reserved.  CPT only © 2015 [employer] Medical  Association.  All Rights Reserved.

## 2017-08-11 NOTE — PROGRESS NOTES
Discharge Planning Assessment   [place]     Patient Name: Deborah Rosas  MRN: [id number]  Today's Date: 8/11/2017    Admit Date: 8/10/2017          Discharge Needs Assessment       08/11/17 1237    Living Environment    Lives With spouse;child(anabela), dependent   Patient lives with her  and child.     Living Arrangements mobile home    Home Accessibility no concerns    Transportation Available car;family or friend will provide   Patients  or mother will provide transportation at discharge.    Living Environment    Quality Of Family Relationships supportive;helpful;involved    Able to Return to Prior Living Arrangements yes    Discharge Needs Assessment    Concerns To Be Addressed no discharge needs identified    Readmission Within The Last 30 Days no previous admission in last 30 days    Equipment Currently Used at Home nebulizer    Equipment Needed After Discharge none    Discharge Disposition still a patient            Discharge Plan       08/11/17 8565    Case Management/Social Work Plan    Plan Patient comes from home where she lives with her  and 2 month daughter. She plans to return there at discharge. Patient has a nebulizer at home. No needs identified at this time. CM will continue to follow.     Patient/Family In Agreement With Plan yes    Additional Comments Patient came to ER via EMS. Patient c/o wheezing and cough and congestion r/t asthma exacerbation. Patient is approximately 45 days post partum.  Admit to CCU, order IV solumedrol, consult pulmonology.        Discharge Placement     No information found                Demographic Summary       08/11/17 1236    Referral Information    Admission Type inpatient    Arrived From home or self-care    Referral Source admission list    Reason For Consult discharge planning    Record Reviewed medical record    Contact Information    Permission Granted to Share Information With     Primary Care Physician Information    Name  Dr Rosales and Dr Somers in [place]            Functional Status       08/11/17 1236    Functional Status Current    Current Functional Level Comment Patient is independent with ADL's.    Functional Status Prior    Prior Functional Level Comment Patient is independent with ADL's.    Activity Tolerance    Current Activity Limitations other (see comments)   Up to bathroom with assist    Usual Activity Tolerance good    Current Activity Tolerance moderate    Cognitive/Perceptual/Developmental    Current Mental Status/Cognitive Functioning no deficits noted    Recent Changes in Mental Status/Cognitive Functioning no changes    Employment/Financial    Employment/Finance Comments Patient has [employer]. She utilizes [facility] for her pharmacy. She does not have any issues obtaining her prescriptions at this time.            Legal       08/11/17 1241    Legal    Legal Comments Patient does not have POA/AD and does not wish to receive information at this time.              Mandy Wright RN

## 2017-08-11 NOTE — PROGRESS NOTES
Subjective     History:   Deborah Rosas is a 22 y.o. female admitted on 8/10/2017 secondary to Acute severe exacerbation of asthma     Procedures: None    Patient seen and examined with CHARISMA Robin. Awake and alert with her mother present at bedside. States she does not feel much improved from last PM. Continues to report dyspnea. Denies CP. Denies nausea or vomiting. Denies dysuria. No acute events overnight per RN.     History taken from: patient, chart, and RN.      Objective     Vital Signs  Temp:  [97.5 °F (36.4 °C)-98.3 °F (36.8 °C)] 97.5 °F (36.4 °C)  Heart Rate:  [] 114  Resp:  [16-22] 16  BP: ()/(48-90) 107/48    Intake/Output Summary (Last 24 hours) at 08/11/17 0917  Last data filed at 08/11/17 0500   Gross per 24 hour   Intake           1792.5 ml   Output              500 ml   Net           1292.5 ml         Physical Exam:  General:    Awake, alert, in no acute distress, thin appearing   Heart:      Normal S1 and S2. Tachycardic. No significant murmur, rubs or gallops appreciated.   Lungs:     Respirations regular, even and unlabored. Diffuse wheezing throughout with poor air movement.    Abdomen:   Soft and nontender. No guarding, rebound tenderness or  organomegaly noted. Bowel sounds present x 4.   Extremities:  No clubbing, cyanosis or edema noted. Moves UE and LE equally B/L.     Results Review:      Results from last 7 days  Lab Units 08/11/17  0519 08/11/17  0039 08/10/17  1238   WBC 10*3/mm3 9.90 9.33 14.52*   HEMOGLOBIN g/dL 9.8* 9.9* 12.9   PLATELETS 10*3/mm3 245 266 300       Results from last 7 days  Lab Units 08/11/17  0519 08/11/17  0039 08/10/17  1238   SODIUM mmol/L 139 140 140   POTASSIUM mmol/L 4.1 4.0 3.8   CHLORIDE mmol/L 111 109 104   CO2 mmol/L 21.4* 24.2* 26.6   BUN mg/dL 6* 8 9   CREATININE mg/dL 0.56 0.60 0.71   CALCIUM mg/dL 8.9 8.9 10.3*   GLUCOSE mg/dL 125* 133* 106       Results from last 7 days  Lab Units 08/11/17  0519 08/10/17  1238   BILIRUBIN mg/dL 0.3 0.4    ALK PHOS U/L 92 139*   AST (SGOT) U/L 26 34*   ALT (SGPT) U/L 16 21       Results from last 7 days  Lab Units 08/10/17  1238   MAGNESIUM mg/dL 1.8           Results from last 7 days  Lab Units 08/11/17  0519 08/11/17  0039   CK TOTAL U/L 282* 303*   TROPONIN I ng/mL <0.006 <0.006       Imaging Results (last 24 hours)     Procedure Component Value Units Date/Time    XR Chest 1 View [[id number]] Collected:  08/10/17 1248     Updated:  08/10/17 1308    Narrative:       XR CHEST 1 VW-     CLINICAL INDICATION: Simple sepsis protocol          COMPARISON: 6/3/2016      TECHNIQUE: Single frontal view of the chest.     FINDINGS:     There is no focal alveolar infiltrate or effusion.  The cardiac silhouette is normal. The pulmonary vasculature is  unremarkable.  There is no evidence of an acute osseous abnormality.   There are no suspicious-appearing parenchymal soft tissue nodules.            Impression:       No evidence of active or acute cardiopulmonary disease on today's chest  radiograph.         This report was finalized on 8/10/2017 12:48 PM by Dr. Juan J Solo MD.               Medications:    amoxicillin-clavulanate 1 tablet Oral Q12H   azithromycin 500 mg Intravenous Q24H   budesonide 0.25 mg Nebulization BID - RT   buprenorphine 8 mg Sublingual Daily   famotidine 20 mg Intravenous Q12H   heparin (porcine) 5,000 Units Subcutaneous Q12H   ipratropium-albuterol 3 mL Nebulization Q4H - RT   methylPREDNISolone sodium succinate 40 mg Intravenous Q12H   IV Fluids 1000 mL + additives 125 mL/hr Intravenous Daily   Pharmacy Meds to Bed Consult  Does not apply Daily              Assessment/Plan   -Acute severe exacerbation of asthma with acute hypercapnic respiratory failure:  IV epinephrine and Ativan were given in ED.  Cont IV Solumedrol 40mg IV BID. Cont Albuterol inhalants in addition to budesonide inhalants. IV Pepcid has also been ordered. Echocardiogram ordered given post-partum state with dyspnea. Will monitor  closely in the CCU as pt is at high risk for decompensation. Pulm following with input appreciated.     -Severe sepsis: No evidence of pneumonia on CXR but pt is Mycoplasma (+). UA is abnormal with urine culture pending. Evidence of end-organ dysfunction with acute respiratory failure. Pt denies any dysuria. Currently on Azithromycin and Augmentin. Leukocytosis has resolved and lactic acid quickly returned to normal. Cont to follow cultures and repeat labs in the AM.      -Leukocytosis: Likely 2/2 above. Now resolved. Cont antibiotics as outlined above. Repeat labs in the AM.      -Sinus tachycardia: Likely 2/2 above. EKG reveals anterior T wave inversions. CE's negative to date. Echo ordered. Cont to monitor on telemetry.       -Post-partum state:  Echocardiogram ordered in the setting of dyspnea, although pt does not appear volume overloaded.      -Mildly elevated liver enzymes: Viral hepatitis panel is non-reactive. Resolved this AM. Repeat CMP in the AM.     -Hx of substance abuse: Cont home Subutex      -DVT PPX: SQ heparin    Discussed with Dr. Meehan.      Pt is at high risk 2/2 severe asthma exacerbation with respiratory failure at high risk for decompensation, severe sepsis and hx of substance abuse.         Dimitrios Blackman,   08/11/17  9:17 AM

## 2017-08-11 NOTE — ED NOTES
"Patient reports \"cant breath\". 02 sats 97%and >. Patient extremely tight/wheezing. Dr. Perales notified. New orders noted.     Mindi Baldwin RN  08/10/17 2055    "

## 2017-08-11 NOTE — CONSULTS
Referring Provider: Dr. Blackman  Reason for Consultation: Asthma Exacerbation      Chief complaint shortness of breath       History of present illness:      Mrs. Rosas is a 22 year old female who was admitted on 8/10/17 from the ER with asthma exacerbation. She reports she is in [place] from out of town and has been staying in a local hotel. She states that her asthma symptoms have been worsening over the last few days but she had been able to manage them outpatient. On the day of admission she says she checked her Sp02 which was 70% on Room air, her breathing was becoming more labored, wheezing, coughing and more short of breath. She at that time decided to come to the ER. She was treated with Epi, medrol, pepcid and nebulizer's in the ER and admitted to the CCU for further evaluation and treatment. Pulmonary/Critical Care is consulted for further evaluation and management of her asthma exacerbation.     During her stay she has been found to be mycoplasma positive, her chest xray is unremarkable currently, she is on azithromycin for coverage. She was also found to have a UTI, we have started Augmentin for that.  She also is around 5-7 weeks post partum, her baby was born in mid-late June. She has had known asthma since she was a small child, she denies any exacerbations during pregnancy. She was taking xolair around 2 years ago and did well with that but her insurance would no longer cover it. She was a patient at [facility], we have requested those records. She denies tobacco use or second hand smoke exposure. Her only medical history is asthma and seizures; no seizure activity reported in 10 years. She is a full code.    Review Of Systems:  Review of Systems   Constitutional: Positive for fatigue. Negative for chills, fever and unexpected weight change.   HENT: Positive for congestion and rhinorrhea.    Respiratory: Positive for cough, shortness of breath and wheezing.    Cardiovascular:  Negative for chest pain, palpitations and leg swelling.   Gastrointestinal: Negative for abdominal distention and abdominal pain.   Genitourinary: Negative for difficulty urinating.   Musculoskeletal: Negative for arthralgias and myalgias.   Skin: Negative for color change and pallor.   Neurological: Positive for weakness. Negative for dizziness and light-headedness.   Psychiatric/Behavioral: Negative for agitation, behavioral problems and confusion.          History  Past Medical History:   Diagnosis Date   • Asthma    • Seizures     Seizures as a child with last seizure around 10 years ago   , Past Surgical History:   Procedure Laterality Date   • HERNIA REPAIR     , Family History   Problem Relation Age of Onset   • COPD Mother    , Social History   Substance Use Topics   • Smoking status: Never Smoker   • Smokeless tobacco: None   • Alcohol use No   , Prescriptions Prior to Admission   Medication Sig Dispense Refill Last Dose   • buprenorphine (SUBUTEX) 8 MG sublingual tablet SL tablet Place 10 mg under the tongue Daily.   8/9/2017 at Unknown time   • albuterol (PROVENTIL HFA;VENTOLIN HFA) 108 (90 BASE) MCG/ACT inhaler Inhale 2 puffs Every 6 (Six) Hours As Needed for Wheezing.   Unknown at Unknown time   , Scheduled Meds:    amoxicillin-clavulanate 1 tablet Oral Q12H   azithromycin 500 mg Intravenous Q24H   budesonide 0.25 mg Nebulization BID - RT   buprenorphine 8 mg Sublingual Daily   famotidine 20 mg Intravenous Q12H   heparin (porcine) 5,000 Units Subcutaneous Q12H   ipratropium-albuterol 3 mL Nebulization Q4H - RT   methylPREDNISolone sodium succinate 40 mg Intravenous Q12H   IV Fluids 1000 mL + additives 125 mL/hr Intravenous Daily   Pharmacy Meds to Bed Consult  Does not apply Daily   , Continuous Infusions:    and Allergies:  Shellfish-derived products    Objective     Vital Signs   Vitals:    08/11/17 1059   BP:    Pulse:    Resp:    Temp:    SpO2: 100%       Physical Exam:               GENERAL  APPEARANCE: Well developed, well nourished, alert and cooperative, and appears to be in no acute distress.    HEAD: normocephalic.    EYES: PERRL, EOMI. Fundi normal, vision is grossly intact.    THROAT: Oral cavity and pharynx normal. No inflammation, swelling, exudate, or lesions.     NECK: Neck supple.     CARDIAC: Normal S1 and S2. No S3, S4 or murmurs. Rhythm is regular. There is no peripheral edema, cyanosis or pallor. Extremities are warm and well perfused. Capillary refill is less than 2 seconds. No carotid bruits.    Respiratory: course wheezing throughout    GI: Positive bowel sounds. Soft, nondistended, nontender.     Musculoskeletal: No significant deformity or joint abnormality. No edema. Peripheral pulses intact. No varicosities.    NEUROLOGICAL: Strength and sensation symmetric and intact throughout.     PSYCHIATRIC: The mental examination revealed the patient was oriented to person, place, and time.                Results Review:    LABS:    Lab Results   Component Value Date    GLUCOSE 125 (H) 08/11/2017    BUN 6 (L) 08/11/2017    CREATININE 0.56 08/11/2017    EGFRIFNONA 134 08/11/2017    BCR 10.7 08/11/2017    CO2 21.4 (L) 08/11/2017    CALCIUM 8.9 08/11/2017    ALBUMIN 3.80 08/11/2017    LABIL2 1.3 (L) 08/11/2017    AST 26 08/11/2017    ALT 16 08/11/2017    WBC 9.90 08/11/2017    HGB 9.8 (L) 08/11/2017    HCT 33.1 (L) 08/11/2017    MCV 87.1 08/11/2017     08/11/2017     08/11/2017    K 4.1 08/11/2017     08/11/2017    ANIONGAP 6.6 08/11/2017       No results found for: INR, PROTIME                        I reviewed the patient's new clinical results.  I reviewed the patient's new imaging results and agree with the interpretation.      Assessment/Plan      Neuro: alert and oriented x3, no issues.    Asthma Exacerbation-With respiratory failure-respiratory failure is likely due to asthma exacerbation.   am ABG reviewed, metabolic acidosis, she is currently in no respiratory  distress. We will start her on BiPap with settings of 14/4 and back up rate of 12 for HS, napping during day and if shortness of breath resumes. Continue nebulizer's, medrol and supportive care, avoid triggers. Continue supplemental oxygen to maintain SP02 >94%. Monitor respiratory status closely.     Mycoplasma Pneumonia: continue azithromycin. Chest xray reviewed. Continue isolation per hospital protocol. She is afebrile, WBC 9, continue scheduled inhalants and nebulizer's.     Microbiology Results (last 10 days)     Procedure Component Value - Date/Time    Blood Culture [[id number]]  (Normal) Collected:  08/10/17 1332    Lab Status:  Preliminary result Specimen:  Blood from Hand, Left Updated:  08/11/17 0301     Blood Culture No growth at less than 24 hours    Blood Culture [[id number]]  (Normal) Collected:  08/10/17 1238    Lab Status:  Preliminary result Specimen:  Blood from Arm, Left Updated:  08/11/17 0301     Blood Culture No growth at less than 24 hours        Cardiac: continue continuous ECG and v/s monitoring. Maintain MAP >65.     Renal: BUN/Creatinine and electrolytes WNL. Continue Strict I&O. Phosphorous 3.4, Magnesium 1.8, we will replace PO. Chloride rising and metabolic acidosis, we will discontinue NS and start LR.     GI: continue diet as tolerated, no concerns currently.     ID: continue current antibiotics.     Continue DVT prophylaxis.     Principal Problem:    Acute severe exacerbation of asthma  Active Problems:    Respiratory failure    Will follow-up the patient in pulmonary clinic and try and arrange for Xolair.  Patient was seen by me last year in the hospital and patient did not follow-up in pulmonary clinic.  Again counseled her and educated her about asthma and partial to follow-up with me in the pulmonary clinic so that we can take care of her asthma in a better way.    I discussed the patients findings and my recommendations with patient, family, nursing staff, Dr. Blackman.    Sarita  CONCHA Henao  08/11/17  11:20 AM     Scribed for Dr. Meehan by CONCHA Greer.     I, Marino Meehan M.D. attest that the above note accurately reflects the work and decisions made  by me.  Patient was seen and evaluated by Dr. Meehan, including history of present illness, physical exam, assessment, and treatment plan.  The above note was reviewed and edited by Dr. Meehan.  Critical Care time spent in direct patient care: 33 minutes (excluding procedure time, if applicable) including high complexity decision making to assess, manipulate, and support vital organ system failure in this individual who has impairment of one or more vital organ systems such that there is a high probability of imminent or life threatening deterioration in the patient’s condition.

## 2017-08-11 NOTE — PLAN OF CARE
Problem: Patient Care Overview (Adult)  Goal: Plan of Care Review  Outcome: Ongoing (interventions implemented as appropriate)  Goal: Adult Individualization and Mutuality  Outcome: Ongoing (interventions implemented as appropriate)  Goal: Discharge Needs Assessment  Outcome: Ongoing (interventions implemented as appropriate)    Problem: Asthma (Adult)  Goal: Signs and Symptoms of Listed Potential Problems Will be Absent or Manageable (Asthma)  Outcome: Ongoing (interventions implemented as appropriate)    Problem: Fall Risk (Adult)  Goal: Identify Related Risk Factors and Signs and Symptoms  Outcome: Ongoing (interventions implemented as appropriate)  Goal: Absence of Falls  Outcome: Ongoing (interventions implemented as appropriate)

## 2017-08-12 ENCOUNTER — APPOINTMENT (OUTPATIENT)
Dept: GENERAL RADIOLOGY | Facility: HOSPITAL | Age: 22
End: 2017-08-12

## 2017-08-12 LAB
ANION GAP SERPL CALCULATED.3IONS-SCNC: 6.9 MMOL/L (ref 3.6–11.2)
BASOPHILS # BLD AUTO: 0.01 10*3/MM3 (ref 0–0.3)
BASOPHILS NFR BLD AUTO: 0.1 % (ref 0–2)
BUN BLD-MCNC: 8 MG/DL (ref 7–21)
BUN/CREAT SERPL: 11.1 (ref 7–25)
CALCIUM SPEC-SCNC: 9.8 MG/DL (ref 7.7–10)
CHLORIDE SERPL-SCNC: 109 MMOL/L (ref 99–112)
CO2 SERPL-SCNC: 23.1 MMOL/L (ref 24.3–31.9)
CREAT BLD-MCNC: 0.72 MG/DL (ref 0.43–1.29)
CRP SERPL-MCNC: <0.5 MG/DL (ref 0–0.99)
DEPRECATED RDW RBC AUTO: 45.7 FL (ref 37–54)
EOSINOPHIL # BLD AUTO: 0 10*3/MM3 (ref 0–0.7)
EOSINOPHIL NFR BLD AUTO: 0 % (ref 0–5)
ERYTHROCYTE [DISTWIDTH] IN BLOOD BY AUTOMATED COUNT: 14.8 % (ref 11.5–14.5)
GFR SERPL CREATININE-BSD FRML MDRD: 100 ML/MIN/1.73
GLUCOSE BLD-MCNC: 107 MG/DL (ref 70–110)
HCT VFR BLD AUTO: 37.7 % (ref 37–47)
HGB BLD-MCNC: 11.5 G/DL (ref 12–16)
IMM GRANULOCYTES # BLD: 0.03 10*3/MM3 (ref 0–0.03)
IMM GRANULOCYTES NFR BLD: 0.2 % (ref 0–0.5)
LYMPHOCYTES # BLD AUTO: 1.73 10*3/MM3 (ref 1–3)
LYMPHOCYTES NFR BLD AUTO: 13.4 % (ref 21–51)
MAGNESIUM SERPL-MCNC: 2.2 MG/DL (ref 1.7–2.6)
MCH RBC QN AUTO: 26.4 PG (ref 27–33)
MCHC RBC AUTO-ENTMCNC: 30.5 G/DL (ref 33–37)
MCV RBC AUTO: 86.5 FL (ref 80–94)
MONOCYTES # BLD AUTO: 0.89 10*3/MM3 (ref 0.1–0.9)
MONOCYTES NFR BLD AUTO: 6.9 % (ref 0–10)
NEUTROPHILS # BLD AUTO: 10.23 10*3/MM3 (ref 1.4–6.5)
NEUTROPHILS NFR BLD AUTO: 79.4 % (ref 30–70)
OSMOLALITY SERPL CALC.SUM OF ELEC: 276.3 MOSM/KG (ref 273–305)
PLATELET # BLD AUTO: 331 10*3/MM3 (ref 130–400)
PMV BLD AUTO: 10.9 FL (ref 6–10)
POTASSIUM BLD-SCNC: 4.4 MMOL/L (ref 3.5–5.3)
RBC # BLD AUTO: 4.36 10*6/MM3 (ref 4.2–5.4)
SODIUM BLD-SCNC: 139 MMOL/L (ref 135–153)
WBC NRBC COR # BLD: 12.89 10*3/MM3 (ref 4.5–12.5)

## 2017-08-12 PROCEDURE — 85025 COMPLETE CBC W/AUTO DIFF WBC: CPT | Performed by: PHYSICIAN ASSISTANT

## 2017-08-12 PROCEDURE — 80048 BASIC METABOLIC PNL TOTAL CA: CPT | Performed by: PHYSICIAN ASSISTANT

## 2017-08-12 PROCEDURE — 86140 C-REACTIVE PROTEIN: CPT | Performed by: INTERNAL MEDICINE

## 2017-08-12 PROCEDURE — 83735 ASSAY OF MAGNESIUM: CPT | Performed by: NURSE PRACTITIONER

## 2017-08-12 PROCEDURE — 94799 UNLISTED PULMONARY SVC/PX: CPT

## 2017-08-12 PROCEDURE — 25010000002 HEPARIN (PORCINE) PER 1000 UNITS: Performed by: PHYSICIAN ASSISTANT

## 2017-08-12 PROCEDURE — 25010000002 AZITHROMYCIN: Performed by: HOSPITALIST

## 2017-08-12 PROCEDURE — 71010 HC CHEST AP: CPT

## 2017-08-12 PROCEDURE — 93010 ELECTROCARDIOGRAM REPORT: CPT | Performed by: INTERNAL MEDICINE

## 2017-08-12 PROCEDURE — 99233 SBSQ HOSP IP/OBS HIGH 50: CPT | Performed by: INTERNAL MEDICINE

## 2017-08-12 PROCEDURE — 94660 CPAP INITIATION&MGMT: CPT

## 2017-08-12 PROCEDURE — 25010000002 METHYLPREDNISOLONE PER 40 MG: Performed by: PHYSICIAN ASSISTANT

## 2017-08-12 PROCEDURE — 25010000002 THIAMINE PER 100 MG: Performed by: INTERNAL MEDICINE

## 2017-08-12 PROCEDURE — 93005 ELECTROCARDIOGRAM TRACING: CPT | Performed by: INTERNAL MEDICINE

## 2017-08-12 PROCEDURE — 71010 XR CHEST AP: CPT | Performed by: RADIOLOGY

## 2017-08-12 RX ORDER — BENZONATATE 100 MG/1
200 CAPSULE ORAL 3 TIMES DAILY PRN
Status: DISCONTINUED | OUTPATIENT
Start: 2017-08-12 | End: 2017-08-15 | Stop reason: HOSPADM

## 2017-08-12 RX ORDER — MAGNESIUM SULFATE HEPTAHYDRATE 40 MG/ML
2 INJECTION, SOLUTION INTRAVENOUS AS NEEDED
Status: DISCONTINUED | OUTPATIENT
Start: 2017-08-12 | End: 2017-08-15 | Stop reason: HOSPADM

## 2017-08-12 RX ORDER — POTASSIUM CHLORIDE 750 MG/1
40 TABLET, FILM COATED, EXTENDED RELEASE ORAL AS NEEDED
Status: DISCONTINUED | OUTPATIENT
Start: 2017-08-12 | End: 2017-08-15 | Stop reason: HOSPADM

## 2017-08-12 RX ORDER — GUAIFENESIN/DEXTROMETHORPHAN 100-10MG/5
5 SYRUP ORAL EVERY 4 HOURS PRN
Status: DISCONTINUED | OUTPATIENT
Start: 2017-08-12 | End: 2017-08-15 | Stop reason: HOSPADM

## 2017-08-12 RX ORDER — POTASSIUM CHLORIDE 7.45 MG/ML
10 INJECTION INTRAVENOUS
Status: DISCONTINUED | OUTPATIENT
Start: 2017-08-12 | End: 2017-08-15 | Stop reason: HOSPADM

## 2017-08-12 RX ORDER — POTASSIUM CHLORIDE 1.5 G/1.77G
40 POWDER, FOR SOLUTION ORAL AS NEEDED
Status: DISCONTINUED | OUTPATIENT
Start: 2017-08-12 | End: 2017-08-15 | Stop reason: HOSPADM

## 2017-08-12 RX ORDER — MAGNESIUM SULFATE HEPTAHYDRATE 40 MG/ML
4 INJECTION, SOLUTION INTRAVENOUS AS NEEDED
Status: DISCONTINUED | OUTPATIENT
Start: 2017-08-12 | End: 2017-08-15 | Stop reason: HOSPADM

## 2017-08-12 RX ADMIN — BUDESONIDE 0.25 MG: 0.25 SUSPENSION RESPIRATORY (INHALATION) at 07:30

## 2017-08-12 RX ADMIN — THIAMINE HYDROCHLORIDE 125 ML/HR: 100 INJECTION, SOLUTION INTRAMUSCULAR; INTRAVENOUS at 09:07

## 2017-08-12 RX ADMIN — IPRATROPIUM BROMIDE AND ALBUTEROL SULFATE 3 ML: .5; 3 SOLUTION RESPIRATORY (INHALATION) at 10:32

## 2017-08-12 RX ADMIN — GUAIFENESIN AND DEXTROMETHORPHAN 5 ML: 100; 10 SYRUP ORAL at 16:37

## 2017-08-12 RX ADMIN — BENZONATATE 200 MG: 100 CAPSULE ORAL at 16:37

## 2017-08-12 RX ADMIN — IPRATROPIUM BROMIDE AND ALBUTEROL SULFATE 3 ML: .5; 3 SOLUTION RESPIRATORY (INHALATION) at 07:23

## 2017-08-12 RX ADMIN — METHYLPREDNISOLONE SODIUM SUCCINATE 40 MG: 40 INJECTION, POWDER, FOR SOLUTION INTRAMUSCULAR; INTRAVENOUS at 01:21

## 2017-08-12 RX ADMIN — BUPRENORPHINE HCL 8 MG: 2 TABLET SUBLINGUAL at 09:08

## 2017-08-12 RX ADMIN — IPRATROPIUM BROMIDE AND ALBUTEROL SULFATE 3 ML: .5; 3 SOLUTION RESPIRATORY (INHALATION) at 22:44

## 2017-08-12 RX ADMIN — IPRATROPIUM BROMIDE AND ALBUTEROL SULFATE 3 ML: .5; 3 SOLUTION RESPIRATORY (INHALATION) at 02:09

## 2017-08-12 RX ADMIN — IPRATROPIUM BROMIDE AND ALBUTEROL SULFATE 3 ML: .5; 3 SOLUTION RESPIRATORY (INHALATION) at 19:41

## 2017-08-12 RX ADMIN — DOCUSATE SODIUM 100 MG: 100 CAPSULE, LIQUID FILLED ORAL at 16:37

## 2017-08-12 RX ADMIN — FAMOTIDINE 20 MG: 10 INJECTION, SOLUTION INTRAVENOUS at 09:07

## 2017-08-12 RX ADMIN — DOCUSATE SODIUM 100 MG: 100 CAPSULE, LIQUID FILLED ORAL at 09:07

## 2017-08-12 RX ADMIN — FAMOTIDINE 20 MG: 10 INJECTION, SOLUTION INTRAVENOUS at 21:12

## 2017-08-12 RX ADMIN — IPRATROPIUM BROMIDE AND ALBUTEROL SULFATE 3 ML: .5; 3 SOLUTION RESPIRATORY (INHALATION) at 14:36

## 2017-08-12 RX ADMIN — AZITHROMYCIN 500 MG: 500 INJECTION, POWDER, LYOPHILIZED, FOR SOLUTION INTRAVENOUS at 16:37

## 2017-08-12 RX ADMIN — AMOXICILLIN AND CLAVULANATE POTASSIUM 500 MG: 500; 125 TABLET, FILM COATED ORAL at 09:07

## 2017-08-12 RX ADMIN — BUDESONIDE 0.25 MG: 0.25 SUSPENSION RESPIRATORY (INHALATION) at 19:40

## 2017-08-12 RX ADMIN — AMOXICILLIN AND CLAVULANATE POTASSIUM 500 MG: 500; 125 TABLET, FILM COATED ORAL at 21:12

## 2017-08-12 RX ADMIN — HEPARIN SODIUM 5000 UNITS: 5000 INJECTION, SOLUTION INTRAVENOUS; SUBCUTANEOUS at 09:08

## 2017-08-12 RX ADMIN — METHYLPREDNISOLONE SODIUM SUCCINATE 40 MG: 40 INJECTION, POWDER, FOR SOLUTION INTRAMUSCULAR; INTRAVENOUS at 12:43

## 2017-08-12 RX ADMIN — HEPARIN SODIUM 5000 UNITS: 5000 INJECTION, SOLUTION INTRAVENOUS; SUBCUTANEOUS at 21:12

## 2017-08-12 NOTE — PLAN OF CARE
Problem: Patient Care Overview (Adult)  Goal: Plan of Care Review  Outcome: Ongoing (interventions implemented as appropriate)  Goal: Adult Individualization and Mutuality  Outcome: Ongoing (interventions implemented as appropriate)  Goal: Discharge Needs Assessment  Outcome: Ongoing (interventions implemented as appropriate)    Problem: Asthma (Adult)  Goal: Signs and Symptoms of Listed Potential Problems Will be Absent or Manageable (Asthma)  Outcome: Ongoing (interventions implemented as appropriate)    Problem: Fall Risk (Adult)  Goal: Identify Related Risk Factors and Signs and Symptoms  Outcome: Ongoing (interventions implemented as appropriate)  Goal: Absence of Falls  Outcome: Ongoing (interventions implemented as appropriate)    Problem: NPPV/CPAP (Adult)  Goal: Signs and Symptoms of Listed Potential Problems Will be Absent or Manageable (NPPV/CPAP)  Outcome: Ongoing (interventions implemented as appropriate)

## 2017-08-12 NOTE — PLAN OF CARE
Problem: NPPV/CPAP (Adult)  Goal: Signs and Symptoms of Listed Potential Problems Will be Absent or Manageable (NPPV/CPAP)  Outcome: Ongoing (interventions implemented as appropriate)  Patient on NC 2L this morning.  Patient did not wear BIPAP last night.  Patient alert and responsive.  Mother at bedside.  Spo2 98%.

## 2017-08-13 LAB
A-A DO2: 9.4 MMHG (ref 0–300)
ANION GAP SERPL CALCULATED.3IONS-SCNC: 10.3 MMOL/L (ref 3.6–11.2)
ARTERIAL PATENCY WRIST A: POSITIVE
ATMOSPHERIC PRESS: 726 MMHG
BACTERIA SPEC AEROBE CULT: NORMAL
BASE EXCESS BLDA CALC-SCNC: -0.4 MMOL/L
BASOPHILS # BLD AUTO: 0.02 10*3/MM3 (ref 0–0.3)
BASOPHILS NFR BLD AUTO: 0.2 % (ref 0–2)
BDY SITE: ABNORMAL
BODY TEMPERATURE: 98.6 C
BUN BLD-MCNC: 8 MG/DL (ref 7–21)
BUN/CREAT SERPL: 12.1 (ref 7–25)
CALCIUM SPEC-SCNC: 9.5 MG/DL (ref 7.7–10)
CHLORIDE SERPL-SCNC: 106 MMOL/L (ref 99–112)
CO2 SERPL-SCNC: 24.7 MMOL/L (ref 24.3–31.9)
COHGB MFR BLD: 0.8 % (ref 0–5)
CREAT BLD-MCNC: 0.66 MG/DL (ref 0.43–1.29)
DEPRECATED RDW RBC AUTO: 46.1 FL (ref 37–54)
EOSINOPHIL # BLD AUTO: 0.06 10*3/MM3 (ref 0–0.7)
EOSINOPHIL NFR BLD AUTO: 0.6 % (ref 0–5)
ERYTHROCYTE [DISTWIDTH] IN BLOOD BY AUTOMATED COUNT: 14.7 % (ref 11.5–14.5)
GFR SERPL CREATININE-BSD FRML MDRD: 111 ML/MIN/1.73
GLUCOSE BLD-MCNC: 98 MG/DL (ref 70–110)
HCO3 BLDA-SCNC: 24.3 MMOL/L (ref 22–26)
HCT VFR BLD AUTO: 34.4 % (ref 37–47)
HCT VFR BLD CALC: 31 % (ref 37–47)
HGB BLD-MCNC: 10.3 G/DL (ref 12–16)
HGB BLDA-MCNC: 10.7 G/DL (ref 12–16)
HOROWITZ INDEX BLD+IHG-RTO: 21 %
IMM GRANULOCYTES # BLD: 0.02 10*3/MM3 (ref 0–0.03)
IMM GRANULOCYTES NFR BLD: 0.2 % (ref 0–0.5)
LYMPHOCYTES # BLD AUTO: 1.88 10*3/MM3 (ref 1–3)
LYMPHOCYTES NFR BLD AUTO: 17.6 % (ref 21–51)
MAGNESIUM SERPL-MCNC: 2.1 MG/DL (ref 1.7–2.6)
MCH RBC QN AUTO: 26.1 PG (ref 27–33)
MCHC RBC AUTO-ENTMCNC: 29.9 G/DL (ref 33–37)
MCV RBC AUTO: 87.3 FL (ref 80–94)
METHGB BLD QL: 0.2 % (ref 0–3)
MODALITY: ABNORMAL
MONOCYTES # BLD AUTO: 0.55 10*3/MM3 (ref 0.1–0.9)
MONOCYTES NFR BLD AUTO: 5.2 % (ref 0–10)
NEUTROPHILS # BLD AUTO: 8.14 10*3/MM3 (ref 1.4–6.5)
NEUTROPHILS NFR BLD AUTO: 76.2 % (ref 30–70)
OSMOLALITY SERPL CALC.SUM OF ELEC: 279.6 MOSM/KG (ref 273–305)
OXYHGB MFR BLDV: 95.1 % (ref 85–100)
PCO2 BLDA: 39.9 MM HG (ref 35–45)
PH BLDA: 7.4 PH UNITS (ref 7.35–7.45)
PLATELET # BLD AUTO: 273 10*3/MM3 (ref 130–400)
PMV BLD AUTO: 10.4 FL (ref 6–10)
PO2 BLDA: 85.4 MM HG (ref 80–100)
POTASSIUM BLD-SCNC: 4.1 MMOL/L (ref 3.5–5.3)
RBC # BLD AUTO: 3.94 10*6/MM3 (ref 4.2–5.4)
SAO2 % BLDCOA: 96.1 % (ref 90–100)
SODIUM BLD-SCNC: 141 MMOL/L (ref 135–153)
WBC NRBC COR # BLD: 10.67 10*3/MM3 (ref 4.5–12.5)

## 2017-08-13 PROCEDURE — 99233 SBSQ HOSP IP/OBS HIGH 50: CPT | Performed by: INTERNAL MEDICINE

## 2017-08-13 PROCEDURE — 25010000002 METHYLPREDNISOLONE PER 40 MG: Performed by: PHYSICIAN ASSISTANT

## 2017-08-13 PROCEDURE — 83050 HGB METHEMOGLOBIN QUAN: CPT | Performed by: INTERNAL MEDICINE

## 2017-08-13 PROCEDURE — 36600 WITHDRAWAL OF ARTERIAL BLOOD: CPT | Performed by: INTERNAL MEDICINE

## 2017-08-13 PROCEDURE — 25010000002 THIAMINE PER 100 MG: Performed by: INTERNAL MEDICINE

## 2017-08-13 PROCEDURE — 85025 COMPLETE CBC W/AUTO DIFF WBC: CPT | Performed by: PHYSICIAN ASSISTANT

## 2017-08-13 PROCEDURE — 80048 BASIC METABOLIC PNL TOTAL CA: CPT | Performed by: PHYSICIAN ASSISTANT

## 2017-08-13 PROCEDURE — 25010000002 HEPARIN (PORCINE) PER 1000 UNITS: Performed by: PHYSICIAN ASSISTANT

## 2017-08-13 PROCEDURE — 82375 ASSAY CARBOXYHB QUANT: CPT | Performed by: INTERNAL MEDICINE

## 2017-08-13 PROCEDURE — 94799 UNLISTED PULMONARY SVC/PX: CPT

## 2017-08-13 PROCEDURE — 82805 BLOOD GASES W/O2 SATURATION: CPT | Performed by: INTERNAL MEDICINE

## 2017-08-13 PROCEDURE — 94660 CPAP INITIATION&MGMT: CPT

## 2017-08-13 PROCEDURE — 25010000002 AZITHROMYCIN: Performed by: HOSPITALIST

## 2017-08-13 PROCEDURE — 83735 ASSAY OF MAGNESIUM: CPT | Performed by: INTERNAL MEDICINE

## 2017-08-13 RX ADMIN — HEPARIN SODIUM 5000 UNITS: 5000 INJECTION, SOLUTION INTRAVENOUS; SUBCUTANEOUS at 10:00

## 2017-08-13 RX ADMIN — IPRATROPIUM BROMIDE AND ALBUTEROL SULFATE 3 ML: .5; 3 SOLUTION RESPIRATORY (INHALATION) at 19:52

## 2017-08-13 RX ADMIN — METHYLPREDNISOLONE SODIUM SUCCINATE 40 MG: 40 INJECTION, POWDER, FOR SOLUTION INTRAMUSCULAR; INTRAVENOUS at 02:42

## 2017-08-13 RX ADMIN — IPRATROPIUM BROMIDE AND ALBUTEROL SULFATE 3 ML: .5; 3 SOLUTION RESPIRATORY (INHALATION) at 14:02

## 2017-08-13 RX ADMIN — IPRATROPIUM BROMIDE AND ALBUTEROL SULFATE 3 ML: .5; 3 SOLUTION RESPIRATORY (INHALATION) at 06:34

## 2017-08-13 RX ADMIN — IPRATROPIUM BROMIDE AND ALBUTEROL SULFATE 3 ML: .5; 3 SOLUTION RESPIRATORY (INHALATION) at 10:14

## 2017-08-13 RX ADMIN — IPRATROPIUM BROMIDE AND ALBUTEROL SULFATE 3 ML: .5; 3 SOLUTION RESPIRATORY (INHALATION) at 23:09

## 2017-08-13 RX ADMIN — IPRATROPIUM BROMIDE AND ALBUTEROL SULFATE 3 ML: .5; 3 SOLUTION RESPIRATORY (INHALATION) at 03:10

## 2017-08-13 RX ADMIN — AMOXICILLIN AND CLAVULANATE POTASSIUM 500 MG: 500; 125 TABLET, FILM COATED ORAL at 10:00

## 2017-08-13 RX ADMIN — FAMOTIDINE 20 MG: 10 INJECTION, SOLUTION INTRAVENOUS at 21:32

## 2017-08-13 RX ADMIN — DOCUSATE SODIUM 100 MG: 100 CAPSULE, LIQUID FILLED ORAL at 10:00

## 2017-08-13 RX ADMIN — BUDESONIDE 0.25 MG: 0.25 SUSPENSION RESPIRATORY (INHALATION) at 19:51

## 2017-08-13 RX ADMIN — AMOXICILLIN AND CLAVULANATE POTASSIUM 500 MG: 500; 125 TABLET, FILM COATED ORAL at 21:32

## 2017-08-13 RX ADMIN — AZITHROMYCIN 500 MG: 500 INJECTION, POWDER, LYOPHILIZED, FOR SOLUTION INTRAVENOUS at 16:51

## 2017-08-13 RX ADMIN — METHYLPREDNISOLONE SODIUM SUCCINATE 40 MG: 40 INJECTION, POWDER, FOR SOLUTION INTRAMUSCULAR; INTRAVENOUS at 14:34

## 2017-08-13 RX ADMIN — BUPRENORPHINE HCL 8 MG: 2 TABLET SUBLINGUAL at 10:00

## 2017-08-13 RX ADMIN — THIAMINE HYDROCHLORIDE 125 ML/HR: 100 INJECTION, SOLUTION INTRAMUSCULAR; INTRAVENOUS at 09:59

## 2017-08-13 RX ADMIN — HEPARIN SODIUM 5000 UNITS: 5000 INJECTION, SOLUTION INTRAVENOUS; SUBCUTANEOUS at 21:32

## 2017-08-13 RX ADMIN — BUDESONIDE 0.25 MG: 0.25 SUSPENSION RESPIRATORY (INHALATION) at 06:34

## 2017-08-13 RX ADMIN — DOCUSATE SODIUM 100 MG: 100 CAPSULE, LIQUID FILLED ORAL at 18:18

## 2017-08-13 NOTE — PLAN OF CARE
Problem: Patient Care Overview (Adult)  Goal: Plan of Care Review  Outcome: Ongoing (interventions implemented as appropriate)    08/13/17 0158   Coping/Psychosocial Response Interventions   Plan Of Care Reviewed With patient   Patient Care Overview   Progress improving       Goal: Discharge Needs Assessment  Outcome: Ongoing (interventions implemented as appropriate)    08/11/17 1238 08/13/17 0158   Discharge Needs Assessment   Concerns To Be Addressed --  no discharge needs identified   Readmission Within The Last 30 Days --  no previous admission in last 30 days   Equipment Needed After Discharge --  none   Discharge Disposition --  still a patient   Current Health   Anticipated Changes Related to Illness --  none   Self-Care   Equipment Currently Used at Home --  nebulizer   Living Environment   Transportation Available car;family or friend will provide  (Patients  or mother will provide transportation at discharge.) --          Problem: Asthma (Adult)  Goal: Signs and Symptoms of Listed Potential Problems Will be Absent or Manageable (Asthma)  Outcome: Ongoing (interventions implemented as appropriate)    08/13/17 0158   Asthma   Problems Assessed (Asthma) all   Problems Present (Asthma) recurrent exacerbation         Problem: Fall Risk (Adult)  Goal: Identify Related Risk Factors and Signs and Symptoms  Outcome: Ongoing (interventions implemented as appropriate)    08/11/17 0028   Fall Risk   Fall Risk: Related Risk Factors environment unfamiliar   Fall Risk: Signs and Symptoms presence of risk factors       Goal: Absence of Falls  Outcome: Ongoing (interventions implemented as appropriate)    08/13/17 0158   Fall Risk (Adult)   Absence of Falls making progress toward outcome

## 2017-08-13 NOTE — PLAN OF CARE
Problem: Patient Care Overview (Adult)  Goal: Plan of Care Review  Outcome: Ongoing (interventions implemented as appropriate)    Problem: Asthma (Adult)  Goal: Signs and Symptoms of Listed Potential Problems Will be Absent or Manageable (Asthma)  Outcome: Ongoing (interventions implemented as appropriate)    Problem: Fall Risk (Adult)  Goal: Identify Related Risk Factors and Signs and Symptoms  Outcome: Ongoing (interventions implemented as appropriate)

## 2017-08-13 NOTE — PROGRESS NOTES
Subjective     History:   Deborah Rosas is a 22 y.o. female admitted on 8/10/2017 secondary to Acute severe exacerbation of asthma     Procedures: None    Patient seen and examined with CHARISMA Nolen. Awake and alert with her mother sleeping at bedside. States she feels improved again today. Continues to have episodes of dry cough but overall improved. Denies CP or palpitations. Denies dysuria. No acute events overnight per RN.     History taken from: patient, chart, and RN.      Objective     Vital Signs  Temp:  [97.8 °F (36.6 °C)-98.5 °F (36.9 °C)] 97.8 °F (36.6 °C)  Heart Rate:  [] 86  Resp:  [14-22] 18  BP: (110-156)/() 114/67    Intake/Output Summary (Last 24 hours) at 08/13/17 1223  Last data filed at 08/13/17 0959   Gross per 24 hour   Intake             2450 ml   Output              400 ml   Net             2050 ml         Physical Exam:  General:    Awake, alert, in no acute distress, thin appearing   Heart:      Normal S1 and S2. Tachycardic. No significant murmur, rubs or gallops appreciated.   Lungs:     Respirations regular, even and unlabored. Expiratory wheezes in lower lobes but improved. Air movement much improved.      Abdomen:   Soft and nontender. No guarding, rebound tenderness or  organomegaly noted. Bowel sounds present x 4.   Extremities:  No clubbing, cyanosis or edema noted. Moves UE and LE equally B/L.     Results Review:      Results from last 7 days  Lab Units 08/13/17  0433 08/12/17  0040 08/11/17 0519 08/11/17  0039 08/10/17  1238   WBC 10*3/mm3 10.67 12.89* 9.90 9.33 14.52*   HEMOGLOBIN g/dL 10.3* 11.5* 9.8* 9.9* 12.9   PLATELETS 10*3/mm3 273 331 245 266 300       Results from last 7 days  Lab Units 08/13/17  0433 08/12/17  0040 08/11/17  0519 08/11/17  0039 08/10/17  1238   SODIUM mmol/L 141 139 139 140 140   POTASSIUM mmol/L 4.1 4.4 4.1 4.0 3.8   CHLORIDE mmol/L 106 109 111 109 104   CO2 mmol/L 24.7 23.1* 21.4* 24.2* 26.6   BUN mg/dL 8 8 6* 8 9   CREATININE mg/dL 0.66  0.72 0.56 0.60 0.71   CALCIUM mg/dL 9.5 9.8 8.9 8.9 10.3*   GLUCOSE mg/dL 98 107 125* 133* 106       Results from last 7 days  Lab Units 08/11/17  0519 08/10/17  1238   BILIRUBIN mg/dL 0.3 0.4   ALK PHOS U/L 92 139*   AST (SGOT) U/L 26 34*   ALT (SGPT) U/L 16 21       Results from last 7 days  Lab Units 08/13/17  0433 08/12/17  0040 08/10/17  1238   MAGNESIUM mg/dL 2.1 2.2 1.8           Results from last 7 days  Lab Units 08/11/17  1146 08/11/17  0519 08/11/17  0039   CK TOTAL U/L 266* 282* 303*   TROPONIN I ng/mL <0.006 <0.006 <0.006       Imaging Results (last 24 hours)     ** No results found for the last 24 hours. **            Medications:    amoxicillin-clavulanate 1 tablet Oral Q12H   azithromycin 500 mg Intravenous Q24H   budesonide 0.25 mg Nebulization BID - RT   buprenorphine 8 mg Sublingual Daily   docusate sodium 100 mg Oral BID   famotidine 20 mg Intravenous Q12H   heparin (porcine) 5,000 Units Subcutaneous Q12H   ipratropium-albuterol 3 mL Nebulization Q4H - RT   methylPREDNISolone sodium succinate 40 mg Intravenous Q12H   IV Fluids 1000 mL + additives 125 mL/hr Intravenous Daily   Pharmacy Meds to Bed Consult  Does not apply Daily              Assessment/Plan   -Acute severe exacerbation of asthma with acute hypercapnic respiratory failure:  IV epinephrine and Ativan were given in ED.  Cont IV Solumedrol 40mg IV BID. Cont Albuterol inhalants in addition to budesonide inhalants. IV Pepcid has also been ordered. Cont PRN Tessalon pearles and Robitussin. Cont to monitor in PCU as she is at high risk for decompensation. Pulm following with input appreciated.     -Severe sepsis: No evidence of pneumonia on CXR but pt is Mycoplasma (+). UA is abnormal but urine cultures reveals normal urogenital haydee. Evidence of end-organ dysfunction with acute respiratory failure. Lactic acid quickly returned to normal upon admission. Leukocytosis resolved today. CRP is normal. Cont to follow cultures and repeat labs in  the AM.      -Leukocytosis: Resolved today. Cont antibiotics as outlined above. Repeat labs in the AM.      -Sinus tachycardia: Likely 2/2 above. EKG on admission revealed anterior T wave inversions. T wave inversions no longer present on repeat EKG. Serial CE's are negative. Cont to monitor on telemetry.       -Post-partum state:  Echocardiogram ordered in the setting of dyspnea, although pt does not appear volume overloaded. Echo reveals an EF of 65%.       -Mildly elevated liver enzymes: Viral hepatitis panel is non-reactive. Repeat CMP in the AM.     -Hx of substance abuse: Cont home Subutex      -DVT PPX: SQ heparin     Pt is at high risk 2/2 severe asthma exacerbation with respiratory failure at high risk for decompensation, severe sepsis and hx of substance abuse.         Dimitrios Blackman,   08/13/17  12:23 PM

## 2017-08-14 ENCOUNTER — APPOINTMENT (OUTPATIENT)
Dept: GENERAL RADIOLOGY | Facility: HOSPITAL | Age: 22
End: 2017-08-14

## 2017-08-14 LAB
ALBUMIN SERPL-MCNC: 4.2 G/DL (ref 3.5–5)
ALBUMIN/GLOB SERPL: 1.3 G/DL (ref 1.5–2.5)
ALP SERPL-CCNC: 91 U/L (ref 35–104)
ALT SERPL W P-5'-P-CCNC: 12 U/L (ref 10–36)
ANION GAP SERPL CALCULATED.3IONS-SCNC: 8.4 MMOL/L (ref 3.6–11.2)
AST SERPL-CCNC: 24 U/L (ref 10–30)
BASOPHILS # BLD AUTO: 0.03 10*3/MM3 (ref 0–0.3)
BASOPHILS NFR BLD AUTO: 0.3 % (ref 0–2)
BILIRUB SERPL-MCNC: 0.2 MG/DL (ref 0.2–1.8)
BUN BLD-MCNC: 7 MG/DL (ref 7–21)
BUN/CREAT SERPL: 11.3 (ref 7–25)
CALCIUM SPEC-SCNC: 9.5 MG/DL (ref 7.7–10)
CHLORIDE SERPL-SCNC: 106 MMOL/L (ref 99–112)
CO2 SERPL-SCNC: 23.6 MMOL/L (ref 24.3–31.9)
CREAT BLD-MCNC: 0.62 MG/DL (ref 0.43–1.29)
DEPRECATED RDW RBC AUTO: 44.6 FL (ref 37–54)
EOSINOPHIL # BLD AUTO: 0.01 10*3/MM3 (ref 0–0.7)
EOSINOPHIL NFR BLD AUTO: 0.1 % (ref 0–5)
ERYTHROCYTE [DISTWIDTH] IN BLOOD BY AUTOMATED COUNT: 14.4 % (ref 11.5–14.5)
GFR SERPL CREATININE-BSD FRML MDRD: 119 ML/MIN/1.73
GLOBULIN UR ELPH-MCNC: 3.2 GM/DL
GLUCOSE BLD-MCNC: 159 MG/DL (ref 70–110)
HCT VFR BLD AUTO: 33.6 % (ref 37–47)
HGB BLD-MCNC: 10.1 G/DL (ref 12–16)
IMM GRANULOCYTES # BLD: 0.02 10*3/MM3 (ref 0–0.03)
IMM GRANULOCYTES NFR BLD: 0.2 % (ref 0–0.5)
LYMPHOCYTES # BLD AUTO: 2.77 10*3/MM3 (ref 1–3)
LYMPHOCYTES NFR BLD AUTO: 24.9 % (ref 21–51)
MCH RBC QN AUTO: 26 PG (ref 27–33)
MCHC RBC AUTO-ENTMCNC: 30.1 G/DL (ref 33–37)
MCV RBC AUTO: 86.6 FL (ref 80–94)
MONOCYTES # BLD AUTO: 0.92 10*3/MM3 (ref 0.1–0.9)
MONOCYTES NFR BLD AUTO: 8.3 % (ref 0–10)
NEUTROPHILS # BLD AUTO: 7.36 10*3/MM3 (ref 1.4–6.5)
NEUTROPHILS NFR BLD AUTO: 66.2 % (ref 30–70)
OSMOLALITY SERPL CALC.SUM OF ELEC: 277 MOSM/KG (ref 273–305)
PLATELET # BLD AUTO: 330 10*3/MM3 (ref 130–400)
PMV BLD AUTO: 10.2 FL (ref 6–10)
POTASSIUM BLD-SCNC: 3.3 MMOL/L (ref 3.5–5.3)
POTASSIUM BLD-SCNC: 4.2 MMOL/L (ref 3.5–5.3)
PROT SERPL-MCNC: 7.4 G/DL (ref 6–8)
RBC # BLD AUTO: 3.88 10*6/MM3 (ref 4.2–5.4)
SODIUM BLD-SCNC: 138 MMOL/L (ref 135–153)
WBC NRBC COR # BLD: 11.11 10*3/MM3 (ref 4.5–12.5)

## 2017-08-14 PROCEDURE — 85025 COMPLETE CBC W/AUTO DIFF WBC: CPT | Performed by: PHYSICIAN ASSISTANT

## 2017-08-14 PROCEDURE — 94799 UNLISTED PULMONARY SVC/PX: CPT

## 2017-08-14 PROCEDURE — 25010000002 THIAMINE PER 100 MG: Performed by: INTERNAL MEDICINE

## 2017-08-14 PROCEDURE — 84132 ASSAY OF SERUM POTASSIUM: CPT | Performed by: INTERNAL MEDICINE

## 2017-08-14 PROCEDURE — 71010 XR CHEST AP: CPT | Performed by: RADIOLOGY

## 2017-08-14 PROCEDURE — 80053 COMPREHEN METABOLIC PANEL: CPT | Performed by: INTERNAL MEDICINE

## 2017-08-14 PROCEDURE — 99232 SBSQ HOSP IP/OBS MODERATE 35: CPT | Performed by: INTERNAL MEDICINE

## 2017-08-14 PROCEDURE — 71010 HC CHEST AP: CPT

## 2017-08-14 PROCEDURE — 99233 SBSQ HOSP IP/OBS HIGH 50: CPT | Performed by: INTERNAL MEDICINE

## 2017-08-14 PROCEDURE — 25010000002 AZITHROMYCIN: Performed by: HOSPITALIST

## 2017-08-14 PROCEDURE — 25010000002 HEPARIN (PORCINE) PER 1000 UNITS: Performed by: PHYSICIAN ASSISTANT

## 2017-08-14 PROCEDURE — 25010000002 METHYLPREDNISOLONE PER 40 MG: Performed by: PHYSICIAN ASSISTANT

## 2017-08-14 RX ORDER — POTASSIUM CHLORIDE 20 MEQ/1
40 TABLET, EXTENDED RELEASE ORAL EVERY 4 HOURS
Status: COMPLETED | OUTPATIENT
Start: 2017-08-14 | End: 2017-08-14

## 2017-08-14 RX ADMIN — FAMOTIDINE 20 MG: 10 INJECTION, SOLUTION INTRAVENOUS at 23:24

## 2017-08-14 RX ADMIN — IPRATROPIUM BROMIDE AND ALBUTEROL SULFATE 3 ML: .5; 3 SOLUTION RESPIRATORY (INHALATION) at 19:51

## 2017-08-14 RX ADMIN — IPRATROPIUM BROMIDE AND ALBUTEROL SULFATE 3 ML: .5; 3 SOLUTION RESPIRATORY (INHALATION) at 23:19

## 2017-08-14 RX ADMIN — AZITHROMYCIN 500 MG: 500 INJECTION, POWDER, LYOPHILIZED, FOR SOLUTION INTRAVENOUS at 17:21

## 2017-08-14 RX ADMIN — HEPARIN SODIUM 5000 UNITS: 5000 INJECTION, SOLUTION INTRAVENOUS; SUBCUTANEOUS at 09:36

## 2017-08-14 RX ADMIN — IPRATROPIUM BROMIDE AND ALBUTEROL SULFATE 3 ML: .5; 3 SOLUTION RESPIRATORY (INHALATION) at 02:43

## 2017-08-14 RX ADMIN — DOCUSATE SODIUM 100 MG: 100 CAPSULE, LIQUID FILLED ORAL at 09:35

## 2017-08-14 RX ADMIN — AMOXICILLIN AND CLAVULANATE POTASSIUM 500 MG: 500; 125 TABLET, FILM COATED ORAL at 09:35

## 2017-08-14 RX ADMIN — IPRATROPIUM BROMIDE AND ALBUTEROL SULFATE 3 ML: .5; 3 SOLUTION RESPIRATORY (INHALATION) at 10:21

## 2017-08-14 RX ADMIN — THIAMINE HYDROCHLORIDE 125 ML/HR: 100 INJECTION, SOLUTION INTRAMUSCULAR; INTRAVENOUS at 09:36

## 2017-08-14 RX ADMIN — AMOXICILLIN AND CLAVULANATE POTASSIUM 500 MG: 500; 125 TABLET, FILM COATED ORAL at 23:24

## 2017-08-14 RX ADMIN — POTASSIUM CHLORIDE 40 MEQ: 1500 TABLET, EXTENDED RELEASE ORAL at 09:35

## 2017-08-14 RX ADMIN — BUDESONIDE 0.25 MG: 0.25 SUSPENSION RESPIRATORY (INHALATION) at 06:21

## 2017-08-14 RX ADMIN — BUPRENORPHINE HCL 8 MG: 2 TABLET SUBLINGUAL at 09:39

## 2017-08-14 RX ADMIN — HEPARIN SODIUM 5000 UNITS: 5000 INJECTION, SOLUTION INTRAVENOUS; SUBCUTANEOUS at 23:24

## 2017-08-14 RX ADMIN — BUDESONIDE 0.25 MG: 0.25 SUSPENSION RESPIRATORY (INHALATION) at 19:52

## 2017-08-14 RX ADMIN — IPRATROPIUM BROMIDE AND ALBUTEROL SULFATE 3 ML: .5; 3 SOLUTION RESPIRATORY (INHALATION) at 06:21

## 2017-08-14 RX ADMIN — POTASSIUM CHLORIDE 40 MEQ: 1500 TABLET, EXTENDED RELEASE ORAL at 13:22

## 2017-08-14 RX ADMIN — METHYLPREDNISOLONE SODIUM SUCCINATE 40 MG: 40 INJECTION, POWDER, FOR SOLUTION INTRAMUSCULAR; INTRAVENOUS at 13:23

## 2017-08-14 RX ADMIN — DOCUSATE SODIUM 100 MG: 100 CAPSULE, LIQUID FILLED ORAL at 17:21

## 2017-08-14 RX ADMIN — METHYLPREDNISOLONE SODIUM SUCCINATE 40 MG: 40 INJECTION, POWDER, FOR SOLUTION INTRAMUSCULAR; INTRAVENOUS at 00:04

## 2017-08-14 RX ADMIN — IPRATROPIUM BROMIDE AND ALBUTEROL SULFATE 3 ML: .5; 3 SOLUTION RESPIRATORY (INHALATION) at 14:02

## 2017-08-14 RX ADMIN — FAMOTIDINE 20 MG: 10 INJECTION, SOLUTION INTRAVENOUS at 09:35

## 2017-08-14 NOTE — PLAN OF CARE
Problem: Patient Care Overview (Adult)  Goal: Plan of Care Review  Outcome: Ongoing (interventions implemented as appropriate)    Problem: Asthma (Adult)  Goal: Signs and Symptoms of Listed Potential Problems Will be Absent or Manageable (Asthma)  Outcome: Ongoing (interventions implemented as appropriate)

## 2017-08-14 NOTE — PAYOR COMM NOTE
"[facility]   JOSE D KIRK  PHONE  [phone]  FAX  [phone]    CLINICAL UPDATE    Deborah Foley (22 y.o. Female)     Date of Birth Social Security Number Address Home Phone MRN    1995  [id number] [address] [phone] [id number]    [facility] Marital Status          None        Admission Date Admission Type Admitting Provider Attending Provider Department, Room/Bed    8/10/17 Emergency Joesph Esquivel MD Heinss, Karl F, MD [facility] PROGRESS CARE, P217/1P    Discharge Date Discharge Disposition Discharge Destination                      Attending Provider: Joesph Esquivel MD     Allergies:  Shellfish-derived Products    Isolation:  Contact Drop   Infection:  Mycoplasma pneumonia (08/11/17)   Code Status:  FULL    Ht:  64\" (162.6 cm)   Wt:  100 lb 4.8 oz (45.5 kg)    Admission Cmt:  None   Principal Problem:  Acute severe exacerbation of asthma [J45.901]                 Active Insurance as of 8/10/2017     Primary Coverage     Payor Plan Insurance Group Employer/Plan Group    [employer] [place] [employer]ICA[employer]      Payor Plan Address Payor Plan Phone Number Effective From Effective To    [address] [address] [phone] 6/14/2016     [place] [address]       Subscriber Name Subscriber Birth Date Member ID       DEBORAH FOLEY 1995 [id number]                 Emergency Contacts      (Rel.) Home Phone Work Phone Mobile Phone    Michele Foley (Spouse) [phone] -- [phone]    Lisa Bright (Mother) [phone] -- --            Hospital Medications (active)       Dose Frequency Start End    albuterol (PROVENTIL) nebulizer solution 0.083% 2.5 mg/3mL 2.5 mg Every 6 Hours PRN 8/10/2017     Sig - Route: Take 2.5 mg by nebulization Every 6 (Six) Hours As Needed for Wheezing or Shortness of Air. - Nebulization    Cosign for Ordering: Required by Joesph Esquivel MD    amoxicillin-clavulanate (AUGMENTIN) 500-125 MG per " tablet 500 mg 1 tablet Every 12 Hours Scheduled 8/11/2017     Sig - Route: Take 1 tablet by mouth Every 12 (Twelve) Hours. - Oral    AZITHROMYCIN 500 MG/250 ML 0.9% NS IVPB (MBP) 500 mg Every 24 Hours 8/11/2017     Sig - Route: Infuse 250 mL into a venous catheter Daily. - Intravenous    benzonatate (TESSALON) capsule 200 mg 200 mg 3 Times Daily PRN 8/12/2017     Sig - Route: Take 2 capsules by mouth 3 (Three) Times a Day As Needed for Cough. - Oral    budesonide (PULMICORT) nebulizer solution 0.25 mg 0.25 mg 2 Times Daily - RT 8/11/2017     Sig - Route: Take 2 mL by nebulization 2 (Two) Times a Day. - Nebulization    Cosign for Ordering: Required by Joesph Esquivel MD    buprenorphine (SUBUTEX) SL tablet 8 mg 8 mg Daily 8/11/2017     Sig - Route: Place 4 tablets under the tongue Daily. - Sublingual    docusate sodium (COLACE) capsule 100 mg 100 mg 2 Times Daily 8/11/2017     Sig - Route: Take 1 capsule by mouth 2 (Two) Times a Day. - Oral    famotidine (PEPCID) injection 20 mg 20 mg Every 12 Hours Scheduled 8/11/2017     Sig - Route: Infuse 2 mL into a venous catheter Every 12 (Twelve) Hours. - Intravenous    Cosign for Ordering: Required by Joesph Esquivel MD    guaifenesin-dextromethorphan (ROBITUSSIN DM) 100-10 MG/5ML syrup 5 mL 5 mL Every 4 Hours PRN 8/12/2017     Sig - Route: Take 5 mL by mouth Every 4 (Four) Hours As Needed for Cough. - Oral    heparin (porcine) 5000 UNIT/ML injection 5,000 Units 5,000 Units Every 12 Hours Scheduled 8/11/2017     Sig - Route: Inject 1 mL under the skin Every 12 (Twelve) Hours. - Subcutaneous    Cosign for Ordering: Required by Joesph Esquivel MD    ipratropium-albuterol (DUO-NEB) nebulizer solution 3 mL 3 mL Every 4 Hours - RT 8/10/2017     Sig - Route: Take 3 mL by nebulization Every 4 (Four) Hours. - Nebulization    Magnesium Sulfate 2 gram Bolus, followed by 8 gram infusion (total Mg dose 10 grams)- Mg less than or equal to 1mg/dL 2 g As Needed 8/12/2017     Sig - Route:  "Infuse 50 mL into a venous catheter As Needed (Mg less than or equal to 1mg/dL). - Intravenous    Linked Group 1:  \"Or\" Linked Group Details        magnesium sulfate 4 gram infusion- Mg 1.6-1.9 mg/dL 4 g As Needed 8/12/2017     Sig - Route: Infuse 100 mL into a venous catheter As Needed (Mg 1.6-1.9 mg/dL). - Intravenous    Linked Group 1:  \"Or\" Linked Group Details        Magnesium Sulfate 6 gram Infusion (2 gm x 3) -Mg 1.1 -1.5 mg/dL 2 g As Needed 8/12/2017     Sig - Route: Infuse 50 mL into a venous catheter As Needed (Mg 1.1 -1.5 mg/dL). - Intravenous    Linked Group 1:  \"Or\" Linked Group Details        methylPREDNISolone sodium succinate (SOLU-Medrol) injection 40 mg 40 mg Every 12 Hours 8/11/2017     Sig - Route: Infuse 1 mL into a venous catheter Every 12 (Twelve) Hours. - Intravenous    Cosign for Ordering: Required by Joesph Esquivel MD    multiple vitamin (M.V.I. Adult) 10 mL, thiamine (B-1) 100 mg, folic acid 1 mg in lactated ringers 1,000 mL infusion 125 mL/hr Daily 8/11/2017     Sig - Route: Infuse 125 mL/hr into a venous catheter Daily. - Intravenous    Pharmacy Meds to Bed Consult  Daily 8/11/2017     Sig - Route: Daily. - Does not apply    pneumococcal polysaccharide 23-valent (PNEUMOVAX-23) vaccine 0.5 mL 0.5 mL During Hospitalization 8/11/2017     Sig - Route: Inject 0.5 mL into the shoulder, thigh, or buttocks During Hospitalization for Immunization. - Intramuscular    Cosign for Ordering: Required by Joesph Esquivel MD    potassium chloride (K-DUR,KLOR-CON) CR tablet 40 mEq 40 mEq Every 4 Hours 8/14/2017 8/14/2017    Sig - Route: Take 2 tablets by mouth Every 4 (Four) Hours. - Oral    potassium chloride (K-DUR,KLOR-CON) ER tablet 40 mEq 40 mEq As Needed 8/12/2017     Sig - Route: Take 4 tablets by mouth As Needed (potassium replacement.  see admin instructions). - Oral    Linked Group 2:  \"Or\" Linked Group Details        potassium chloride (KLOR-CON) packet 40 mEq 40 mEq As Needed 8/12/2017     Sig " "- Route: Take 40 mEq by mouth As Needed (potassium replacement, see admin instructions). - Oral    Linked Group 2:  \"Or\" Linked Group Details        potassium chloride 10 mEq in 100 mL IVPB 10 mEq Every 1 Hour PRN 8/12/2017     Sig - Route: Infuse 100 mL into a venous catheter Every 1 (One) Hour As Needed (potassium protocol PERIPHERAL - see admin instructions). - Intravenous    Linked Group 2:  \"Or\" Linked Group Details        sodium chloride 0.9 % flush 1-10 mL 1-10 mL As Needed 8/10/2017     Sig - Route: Infuse 1-10 mL into a venous catheter As Needed for Line Care. - Intravenous    Cosign for Ordering: Required by Joesph Esquivel MD    sodium chloride 0.9 % flush 10 mL 10 mL As Needed 8/10/2017     Sig - Route: Infuse 10 mL into a venous catheter As Needed for Line Care. - Intravenous          Orders (last 24 hrs)     Start     Ordered    08/14/17 0800  potassium chloride (K-DUR,KLOR-CON) CR tablet 40 mEq  Every 4 Hours      08/14/17 0644    08/14/17 0600  XR Chest AP  1 Time Imaging      08/13/17 0926    08/14/17 0600  Comprehensive Metabolic Panel  Morning Draw      08/13/17 1229    08/14/17 0600  CBC Auto Differential  PROCEDURE ONCE      08/14/17 0000    08/14/17 0148  Osmolality, Calculated  Once      08/14/17 0147    08/12/17 0815  guaifenesin-dextromethorphan (ROBITUSSIN DM) 100-10 MG/5ML syrup 5 mL  Every 4 Hours PRN      08/12/17 0815    08/12/17 0814  benzonatate (TESSALON) capsule 200 mg  3 Times Daily PRN      08/12/17 0815    08/12/17 0812  Magnesium Sulfate 2 gram Bolus, followed by 8 gram infusion (total Mg dose 10 grams)- Mg less than or equal to 1mg/dL  As Needed      08/12/17 0812    08/12/17 0812  Magnesium Sulfate 6 gram Infusion (2 gm x 3) -Mg 1.1 -1.5 mg/dL  As Needed      08/12/17 0812    08/12/17 0812  magnesium sulfate 4 gram infusion- Mg 1.6-1.9 mg/dL  As Needed      08/12/17 0812 08/12/17 0812  potassium chloride (K-DUR,KLOR-CON) ER tablet 40 mEq  As Needed      08/12/17 0812    " 08/12/17 0812  potassium chloride (KLOR-CON) packet 40 mEq  As Needed      08/12/17 0812    08/12/17 0812  potassium chloride 10 mEq in 100 mL IVPB  Every 1 Hour PRN      08/12/17 0812    08/11/17 1800  docusate sodium (COLACE) capsule 100 mg  2 Times Daily      08/11/17 1323    08/11/17 1600  AZITHROMYCIN 500 MG/250 ML 0.9% NS IVPB (MBP)  Every 24 Hours      08/11/17 0309    08/11/17 0900  multiple vitamin (M.V.I. Adult) 10 mL, thiamine (B-1) 100 mg, folic acid 1 mg in lactated ringers 1,000 mL infusion  Daily      08/11/17 0816    08/11/17 0900  amoxicillin-clavulanate (AUGMENTIN) 500-125 MG per tablet 500 mg  Every 12 Hours Scheduled      08/11/17 0817    08/11/17 0900  buprenorphine (SUBUTEX) SL tablet 8 mg  Daily      08/11/17 0821    08/11/17 0800  Pharmacy Meds to Bed Consult  Daily      08/11/17 0031    08/11/17 0100  heparin (porcine) 5000 UNIT/ML injection 5,000 Units  Every 12 Hours Scheduled      08/10/17 2357    08/11/17 0100  famotidine (PEPCID) injection 20 mg  Every 12 Hours Scheduled      08/10/17 2357    08/11/17 0100  budesonide (PULMICORT) nebulizer solution 0.25 mg  2 Times Daily - RT      08/10/17 2357    08/11/17 0100  methylPREDNISolone sodium succinate (SOLU-Medrol) injection 40 mg  Every 12 Hours      08/10/17 2357 08/11/17 0024  pneumococcal polysaccharide 23-valent (PNEUMOVAX-23) vaccine 0.5 mL  During Hospitalization      08/11/17 0025    08/10/17 2358  CBC & Differential  Daily      08/10/17 2357    08/10/17 2357  sodium chloride 0.9 % flush 1-10 mL  As Needed      08/10/17 2357    08/10/17 2357  albuterol (PROVENTIL) nebulizer solution 0.083% 2.5 mg/3mL  Every 6 Hours PRN      08/10/17 2357    08/10/17 1930  ipratropium-albuterol (DUO-NEB) nebulizer solution 3 mL  Every 4 Hours - RT      08/10/17 1853    08/10/17 1212  sodium chloride 0.9 % flush 10 mL  As Needed      08/10/17 1213    Unscheduled  Oxygen Therapy- Nasal Cannula; 2 LPM; Titrate for SPO2: equal to or greater than, 92%   As Needed      08/10/17 1213    --  albuterol (PROVENTIL HFA;VENTOLIN HFA) 108 (90 BASE) MCG/ACT inhaler  Every 6 Hours PRN      08/10/17 1804    --  buprenorphine (SUBUTEX) 8 MG sublingual tablet SL tablet  Daily      08/10/17 1804    --  SCANNED - TELEMETRY        08/10/17 0000    --  SCANNED - TELEMETRY        08/10/17 0000    --  SCANNED - TELEMETRY        08/10/17 0000    --  SCANNED - TELEMETRY        08/10/17 0000             Physician Progress Notes (last 24 hours) (Notes from 8/13/2017 12:21 PM through 8/14/2017 12:21 PM)      Dimitrios Blackman,  at 8/13/2017 12:23 PM  Version 1 of 1         Subjective     History:   Deborah Rosas is a 22 y.o. female admitted on 8/10/2017 secondary to Acute severe exacerbation of asthma     Procedures: None    Patient seen and examined with CHARISMA Nolen. Awake and alert with her mother sleeping at bedside. States she feels improved again today. Continues to have episodes of dry cough but overall improved. Denies CP or palpitations. Denies dysuria. No acute events overnight per RN.     History taken from: patient, chart, and RN.      Objective     Vital Signs  Temp:  [97.8 °F (36.6 °C)-98.5 °F (36.9 °C)] 97.8 °F (36.6 °C)  Heart Rate:  [] 86  Resp:  [14-22] 18  BP: (110-156)/() 114/67    Intake/Output Summary (Last 24 hours) at 08/13/17 1223  Last data filed at 08/13/17 0959   Gross per 24 hour   Intake             2450 ml   Output              400 ml   Net             2050 ml         Physical Exam:  General:    Awake, alert, in no acute distress, thin appearing   Heart:      Normal S1 and S2. Tachycardic. No significant murmur, rubs or gallops appreciated.   Lungs:     Respirations regular, even and unlabored. Expiratory wheezes in lower lobes but improved. Air movement much improved.      Abdomen:   Soft and nontender. No guarding, rebound tenderness or  organomegaly noted. Bowel sounds present x 4.   Extremities:  No clubbing, cyanosis or edema noted.  Moves UE and LE equally B/L.     Results Review:      Results from last 7 days  Lab Units 08/13/17  0433 08/12/17  0040 08/11/17  0519 08/11/17  0039 08/10/17  1238   WBC 10*3/mm3 10.67 12.89* 9.90 9.33 14.52*   HEMOGLOBIN g/dL 10.3* 11.5* 9.8* 9.9* 12.9   PLATELETS 10*3/mm3 273 331 245 266 300       Results from last 7 days  Lab Units 08/13/17  0433 08/12/17  0040 08/11/17  0519 08/11/17  0039 08/10/17  1238   SODIUM mmol/L 141 139 139 140 140   POTASSIUM mmol/L 4.1 4.4 4.1 4.0 3.8   CHLORIDE mmol/L 106 109 111 109 104   CO2 mmol/L 24.7 23.1* 21.4* 24.2* 26.6   BUN mg/dL 8 8 6* 8 9   CREATININE mg/dL 0.66 0.72 0.56 0.60 0.71   CALCIUM mg/dL 9.5 9.8 8.9 8.9 10.3*   GLUCOSE mg/dL 98 107 125* 133* 106       Results from last 7 days  Lab Units 08/11/17  0519 08/10/17  1238   BILIRUBIN mg/dL 0.3 0.4   ALK PHOS U/L 92 139*   AST (SGOT) U/L 26 34*   ALT (SGPT) U/L 16 21       Results from last 7 days  Lab Units 08/13/17  0433 08/12/17  0040 08/10/17  1238   MAGNESIUM mg/dL 2.1 2.2 1.8           Results from last 7 days  Lab Units 08/11/17  1146 08/11/17  0519 08/11/17  0039   CK TOTAL U/L 266* 282* 303*   TROPONIN I ng/mL <0.006 <0.006 <0.006       Imaging Results (last 24 hours)     ** No results found for the last 24 hours. **            Medications:    amoxicillin-clavulanate 1 tablet Oral Q12H   azithromycin 500 mg Intravenous Q24H   budesonide 0.25 mg Nebulization BID - RT   buprenorphine 8 mg Sublingual Daily   docusate sodium 100 mg Oral BID   famotidine 20 mg Intravenous Q12H   heparin (porcine) 5,000 Units Subcutaneous Q12H   ipratropium-albuterol 3 mL Nebulization Q4H - RT   methylPREDNISolone sodium succinate 40 mg Intravenous Q12H   IV Fluids 1000 mL + additives 125 mL/hr Intravenous Daily   Pharmacy Meds to Bed Consult  Does not apply Daily              Assessment/Plan   -Acute severe exacerbation of asthma with acute hypercapnic respiratory failure:  IV epinephrine and Ativan were given in ED.  Cont IV  Solumedrol 40mg IV BID. Cont Albuterol inhalants in addition to budesonide inhalants. IV Pepcid has also been ordered. Cont PRN Tessalon pearles and Robitussin. Cont to monitor in PCU as she is at high risk for decompensation. Pulm following with input appreciated.     -Severe sepsis: No evidence of pneumonia on CXR but pt is Mycoplasma (+). UA is abnormal but urine cultures reveals normal urogenital haydee. Evidence of end-organ dysfunction with acute respiratory failure. Lactic acid quickly returned to normal upon admission. Leukocytosis resolved today. CRP is normal. Cont to follow cultures and repeat labs in the AM.      -Leukocytosis: Resolved today. Cont antibiotics as outlined above. Repeat labs in the AM.      -Sinus tachycardia: Likely 2/2 above. EKG on admission revealed anterior T wave inversions. T wave inversions no longer present on repeat EKG. Serial CE's are negative. Cont to monitor on telemetry.       -Post-partum state:  Echocardiogram ordered in the setting of dyspnea, although pt does not appear volume overloaded. Echo reveals an EF of 65%.       -Mildly elevated liver enzymes: Viral hepatitis panel is non-reactive. Repeat CMP in the AM.     -Hx of substance abuse: Cont home Subutex      -DVT PPX: SQ heparin     Pt is at high risk 2/2 severe asthma exacerbation with respiratory failure at high risk for decompensation, severe sepsis and hx of substance abuse.         Dimitrios Blackman DO  17  12:23 PM     Electronically signed by Dimitrios Blackman DO at 2017 12:29 PM      Sandie Hauser MD at 2017  9:48 AM  Version 1 of 1             [facility] HOSPITALIST PROGRESS NOTE     Patient Identification:  Name:  Deborah Rosas  Age:  22 y.o.  Sex:  female  :  1995  MRN:  [id number]  Visit Number:  [id number]  Primary Care Provider:  Vic Somers MD    Length of stay:  4    Chief complaint:  22 y.o. female admitted on 8/10/2017 secondary to  Acute severe exacerbation of asthma        Subjective:      No acute issues overnight.  Patient states that her breathing is improved.  Still continues to have cough but denies any sputum production.  Patient denies any other complaints.  Patient denies any fevers, chills, nausea, vomiting, constipation, diarrhea, chest pain or palpitations.    ----------------------------------------------------------------------------------------------------------------------  Current Hospital Meds:    amoxicillin-clavulanate 1 tablet Oral Q12H   azithromycin 500 mg Intravenous Q24H   budesonide 0.25 mg Nebulization BID - RT   buprenorphine 8 mg Sublingual Daily   docusate sodium 100 mg Oral BID   famotidine 20 mg Intravenous Q12H   heparin (porcine) 5,000 Units Subcutaneous Q12H   ipratropium-albuterol 3 mL Nebulization Q4H - RT   methylPREDNISolone sodium succinate 40 mg Intravenous Q12H   IV Fluids 1000 mL + additives 125 mL/hr Intravenous Daily   Pharmacy Meds to Bed Consult  Does not apply Daily   potassium chloride 40 mEq Oral Q4H        ----------------------------------------------------------------------------------------------------------------------  Vital Signs:  Temp:  [97.6 °F (36.4 °C)-98 °F (36.7 °C)] 97.7 °F (36.5 °C)  Heart Rate:  [] 77  Resp:  [16-18] 18  BP: (106-132)/(59-90) 122/78  Last 3 weights    08/12/17  1032 08/13/17  0600 08/14/17  0600   Weight: 105 lb 9.6 oz (47.9 kg) 104 lb 3.2 oz (47.3 kg) 100 lb 4.8 oz (45.5 kg)     Body mass index is 17.22 kg/(m^2).    Intake/Output Summary (Last 24 hours) at 08/14/17 0948  Last data filed at 08/14/17 0935   Gross per 24 hour   Intake             7325 ml   Output             2400 ml   Net             4925 ml     Diet Regular  ----------------------------------------------------------------------------------------------------------------------  Physical exam:  Constitutional:  Well-developed and well-nourished.     HENT:  Head:  Normocephalic and atraumatic.   Mouth:  Moist mucous membranes.    Eyes:  Conjunctivae and EOM are normal.  Pupils are equal, round, and reactive to light.   Neck:  Neck supple.  No JVD present.    Cardiovascular:  Regular rate and rhythm. S1+S2. No murmur, rubs or gallops.   Pulmonary/Chest: Clear to auscultation bilaterally.   Abdominal:  Soft. Non-tender. No viscera palpable.  Bowel sounds audible.   Musculoskeletal: No deformity or joint swelling.   Peripheral vascular: Bilateral dorsalis pedis palpable. No edema.   Neurological:  Alert and oriented to person, place, and time.  Cranial nerves grossly intact. Strength bilaterally symmetrical in upper and lower extremities.   Skin:  Skin is warm and dry. No rash noted. No pallor.   ----------------------------------------------------------------------------------------------------------------------  Tele:    ----------------------------------------------------------------------------------------------------------------------    Results from last 7 days  Lab Units 08/11/17  1146 08/11/17  0519 08/11/17  0039   CK TOTAL U/L 266* 282* 303*   TROPONIN I ng/mL <0.006 <0.006 <0.006       Results from last 7 days  Lab Units 08/14/17  0053 08/13/17  0433 08/12/17  0040  08/10/17  1724 08/10/17  1238   CRP mg/dL  --   --  <0.50  --   --   --    LACTATE mmol/L  --   --   --   --  2.0 2.2*   WBC 10*3/mm3 11.11 10.67 12.89*  < >  --  14.52*   HEMOGLOBIN g/dL 10.1* 10.3* 11.5*  < >  --  12.9   HEMATOCRIT % 33.6* 34.4* 37.7  < >  --  42.1   MCV fL 86.6 87.3 86.5  < >  --  85.6   MCHC g/dL 30.1* 29.9* 30.5*  < >  --  30.6*   PLATELETS 10*3/mm3 330 273 331  < >  --  300   < > = values in this interval not displayed.    Results from last 7 days  Lab Units 08/13/17  0511   PH, ARTERIAL pH units 7.402   PO2 ART mm Hg 85.4   PCO2, ARTERIAL mm Hg 39.9   HCO3 ART mmol/L 24.3       Results from last 7 days  Lab Units 08/14/17  0053 08/13/17  0433 08/12/17  0040 08/11/17  0519  08/10/17  1238   SODIUM mmol/L 138 141  139 139  < > 140   POTASSIUM mmol/L 3.3* 4.1 4.4 4.1  < > 3.8   MAGNESIUM mg/dL  --  2.1 2.2  --   --  1.8   CHLORIDE mmol/L 106 106 109 111  < > 104   CO2 mmol/L 23.6* 24.7 23.1* 21.4*  < > 26.6   BUN mg/dL 7 8 8 6*  < > 9   CREATININE mg/dL 0.62 0.66 0.72 0.56  < > 0.71   EGFR IF NONAFRICN AM mL/min/1.73 119 111 100 134  < > 102   CALCIUM mg/dL 9.5 9.5 9.8 8.9  < > 10.3*   GLUCOSE mg/dL 159* 98 107 125*  < > 106   ALBUMIN g/dL 4.20  --   --  3.80  --  5.30*   BILIRUBIN mg/dL 0.2  --   --  0.3  --  0.4   ALK PHOS U/L 91  --   --  92  --  139*   AST (SGOT) U/L 24  --   --  26  --  34*   ALT (SGPT) U/L 12  --   --  16  --  21   < > = values in this interval not displayed.Estimated Creatinine Clearance: 101.4 mL/min (by C-G formula based on Cr of 0.62).    No results found for: AMMONIA      Blood Culture   Date Value Ref Range Status   08/10/2017 No growth at 3 days  Preliminary   08/10/2017 No growth at 3 days  Preliminary     Urine Culture   Date Value Ref Range Status   08/11/2017 >100,000 CFU/mL Normal Urogenital Marlene  Final             I have personally looked at the labs and they are summarized above.  ----------------------------------------------------------------------------------------------------------------------  Imaging Results (last 24 hours)     Procedure Component Value Units Date/Time    XR Chest AP [[id number]] Collected:  08/14/17 0657     Updated:  08/14/17 0659    Narrative:       EXAMINATION: XR CHEST AP-      CLINICAL INDICATION:     Respiratory failure; J45.901-Unspecified asthma  with (acute) exacerbation     TECHNIQUE:  XR CHEST AP-      COMPARISON: 8/12/2017      FINDINGS:   Lungs are aerated.   Heart and mediastinal contours are unremarkable.   No pneumothorax.   No pleural effusion.   No acute osseous findings.            Impression:       Stable, unremarkable chest.     This report was finalized on 8/14/2017 6:57 AM by Dr. Tom Caldwell MD.            ----------------------------------------------------------------------------------------------------------------------  Assessment and Plan:    - Acute hypoxemic respiratory failure  Improved.  ABGs show normal Po2.  Patient maintaining saturation on room air.Patient not using her BiPAP.    - Acute severe exacerbation of asthma  Patient received IV epinephrine and Ativan in ED.    Continue Albuterol nebs, steroid nebs and systemic steroids. Cont PRN Tessalon pearles and Robitussin. Cont to monitor in PCU as she is at high risk for decompensation. Pulm following with input appreciated.      - Severe sepsis  Improved.  Patient afebrile and vitally stable.  No evidence of pneumonia on CXR but serology for mycoplasma is positive. UA is abnormal but urine cultures reveals normal urogenital haydee. Pt had evidence of end-organ dysfunction with acute respiratory failure, resolved now. Lactic acid quickly returned to normal upon admission. Leukocytosis resolved. CRP is normal. Cont to follow cultures. Continue augmentin and azithromycin.       -Leukocytosis  Resolved. Cont antibiotics as outlined above.      - Sinus tachycardia:   Improved. 2/2 sepsis and asthma exacerbation. EKG on admission revealed anterior T wave inversions. T wave inversions no longer present on repeat EKG. Seri/al CE's are negative. Cont to monitor on telemetry.        -Post-partum state:    Echocardiogram ordered in the setting of dyspnea, although pt does not appear volume overloaded. Echo reveals an EF of 65%.        - Mildly elevated liver enzymes  Normalized. Viral hepatitis panel is non-reactive.      - Hx of substance abuse  Cont home Subutex       - Prophylaxis   DVT: SQ heparin  GI: Pepcid    Pt is at high risk 2/2 severe asthma exacerbation with respiratory failure at high risk for decompensation, severe sepsis and hx of substance abuse.       Sandie Hauser MD  08/14/17  9:48 AM     Electronically signed by Sandie Hauser MD at  8/14/2017 12:07 PM        Consult Notes (last 24 hours) (Notes from 8/13/2017 12:21 PM through 8/14/2017 12:21 PM)     No notes of this type exist for this encounter.

## 2017-08-14 NOTE — PROGRESS NOTES
[facility] HOSPITALIST PROGRESS NOTE     Patient Identification:  Name:  Deborah Rosas  Age:  22 y.o.  Sex:  female  :  1995  MRN:  [id number]  Visit Number:  [id number]  Primary Care Provider:  Vic Somers MD    Length of stay:  4    Chief complaint:  22 y.o. female admitted on 8/10/2017 secondary to Acute severe exacerbation of asthma        Subjective:      No acute issues overnight.  Patient states that her breathing is improved.  Still continues to have cough but denies any sputum production.  Patient denies any other complaints.  Patient denies any fevers, chills, nausea, vomiting, constipation, diarrhea, chest pain or palpitations.    ----------------------------------------------------------------------------------------------------------------------  Current Hospital Meds:    amoxicillin-clavulanate 1 tablet Oral Q12H   azithromycin 500 mg Intravenous Q24H   budesonide 0.25 mg Nebulization BID - RT   buprenorphine 8 mg Sublingual Daily   docusate sodium 100 mg Oral BID   famotidine 20 mg Intravenous Q12H   heparin (porcine) 5,000 Units Subcutaneous Q12H   ipratropium-albuterol 3 mL Nebulization Q4H - RT   methylPREDNISolone sodium succinate 40 mg Intravenous Q12H   IV Fluids 1000 mL + additives 125 mL/hr Intravenous Daily   Pharmacy Meds to Bed Consult  Does not apply Daily   potassium chloride 40 mEq Oral Q4H        ----------------------------------------------------------------------------------------------------------------------  Vital Signs:  Temp:  [97.6 °F (36.4 °C)-98 °F (36.7 °C)] 97.7 °F (36.5 °C)  Heart Rate:  [] 77  Resp:  [16-18] 18  BP: (106-132)/(59-90) 122/78  Last 3 weights    17  1032 17  0600 17  0600   Weight: 105 lb 9.6 oz (47.9 kg) 104 lb 3.2 oz (47.3 kg) 100 lb 4.8 oz (45.5 kg)     Body mass index is 17.22 kg/(m^2).    Intake/Output Summary (Last 24 hours) at 17 0948  Last data filed at 17 0935   Gross per 24 hour    Intake             7325 ml   Output             2400 ml   Net             4925 ml     Diet Regular  ----------------------------------------------------------------------------------------------------------------------  Physical exam:  Constitutional:  Well-developed and well-nourished.     HENT:  Head:  Normocephalic and atraumatic.  Mouth:  Moist mucous membranes.    Eyes:  Conjunctivae and EOM are normal.  Pupils are equal, round, and reactive to light.   Neck:  Neck supple.  No JVD present.    Cardiovascular:  Regular rate and rhythm. S1+S2. No murmur, rubs or gallops.   Pulmonary/Chest: Clear to auscultation bilaterally.   Abdominal:  Soft. Non-tender. No viscera palpable.  Bowel sounds audible.   Musculoskeletal: No deformity or joint swelling.   Peripheral vascular: Bilateral dorsalis pedis palpable. No edema.   Neurological:  Alert and oriented to person, place, and time.  Cranial nerves grossly intact. Strength bilaterally symmetrical in upper and lower extremities.   Skin:  Skin is warm and dry. No rash noted. No pallor.   ----------------------------------------------------------------------------------------------------------------------  Tele:    ----------------------------------------------------------------------------------------------------------------------    Results from last 7 days  Lab Units 08/11/17  1146 08/11/17  0519 08/11/17  0039   CK TOTAL U/L 266* 282* 303*   TROPONIN I ng/mL <0.006 <0.006 <0.006       Results from last 7 days  Lab Units 08/14/17  0053 08/13/17  0433 08/12/17  0040  08/10/17  1724 08/10/17  1238   CRP mg/dL  --   --  <0.50  --   --   --    LACTATE mmol/L  --   --   --   --  2.0 2.2*   WBC 10*3/mm3 11.11 10.67 12.89*  < >  --  14.52*   HEMOGLOBIN g/dL 10.1* 10.3* 11.5*  < >  --  12.9   HEMATOCRIT % 33.6* 34.4* 37.7  < >  --  42.1   MCV fL 86.6 87.3 86.5  < >  --  85.6   MCHC g/dL 30.1* 29.9* 30.5*  < >  --  30.6*   PLATELETS 10*3/mm3 330 273 331  < >  --  300   < > =  values in this interval not displayed.    Results from last 7 days  Lab Units 08/13/17  0511   PH, ARTERIAL pH units 7.402   PO2 ART mm Hg 85.4   PCO2, ARTERIAL mm Hg 39.9   HCO3 ART mmol/L 24.3       Results from last 7 days  Lab Units 08/14/17  0053 08/13/17  0433 08/12/17  0040 08/11/17  0519  08/10/17  1238   SODIUM mmol/L 138 141 139 139  < > 140   POTASSIUM mmol/L 3.3* 4.1 4.4 4.1  < > 3.8   MAGNESIUM mg/dL  --  2.1 2.2  --   --  1.8   CHLORIDE mmol/L 106 106 109 111  < > 104   CO2 mmol/L 23.6* 24.7 23.1* 21.4*  < > 26.6   BUN mg/dL 7 8 8 6*  < > 9   CREATININE mg/dL 0.62 0.66 0.72 0.56  < > 0.71   EGFR IF NONAFRICN AM mL/min/1.73 119 111 100 134  < > 102   CALCIUM mg/dL 9.5 9.5 9.8 8.9  < > 10.3*   GLUCOSE mg/dL 159* 98 107 125*  < > 106   ALBUMIN g/dL 4.20  --   --  3.80  --  5.30*   BILIRUBIN mg/dL 0.2  --   --  0.3  --  0.4   ALK PHOS U/L 91  --   --  92  --  139*   AST (SGOT) U/L 24  --   --  26  --  34*   ALT (SGPT) U/L 12  --   --  16  --  21   < > = values in this interval not displayed.Estimated Creatinine Clearance: 101.4 mL/min (by C-G formula based on Cr of 0.62).    No results found for: AMMONIA      Blood Culture   Date Value Ref Range Status   08/10/2017 No growth at 3 days  Preliminary   08/10/2017 No growth at 3 days  Preliminary     Urine Culture   Date Value Ref Range Status   08/11/2017 >100,000 CFU/mL Normal Urogenital Marlene  Final             I have personally looked at the labs and they are summarized above.  ----------------------------------------------------------------------------------------------------------------------  Imaging Results (last 24 hours)     Procedure Component Value Units Date/Time    XR Chest AP [[id number]] Collected:  08/14/17 0657     Updated:  08/14/17 0659    Narrative:       EXAMINATION: XR CHEST AP-      CLINICAL INDICATION:     Respiratory failure; J45.901-Unspecified asthma  with (acute) exacerbation     TECHNIQUE:  XR CHEST AP-      COMPARISON: 8/12/2017       FINDINGS:   Lungs are aerated.   Heart and mediastinal contours are unremarkable.   No pneumothorax.   No pleural effusion.   No acute osseous findings.            Impression:       Stable, unremarkable chest.     This report was finalized on 8/14/2017 6:57 AM by Dr. Tom Caldwell MD.           ----------------------------------------------------------------------------------------------------------------------  Assessment and Plan:    - Acute hypoxemic respiratory failure  Improved.  ABGs show normal Po2.  Patient maintaining saturation on room air.Patient not using her BiPAP.    - Acute severe exacerbation of asthma  Patient received IV epinephrine and Ativan in ED.    Continue Albuterol nebs, steroid nebs and systemic steroids. Cont PRN Tessalon pearles and Robitussin. Cont to monitor in PCU as she is at high risk for decompensation. Pulm following with input appreciated.      - Severe sepsis  Improved.  Patient afebrile and vitally stable.  No evidence of pneumonia on CXR but serology for mycoplasma is positive. UA is abnormal but urine cultures reveals normal urogenital haydee. Pt had evidence of end-organ dysfunction with acute respiratory failure, resolved now. Lactic acid quickly returned to normal upon admission. Leukocytosis resolved. CRP is normal. Cont to follow cultures. Continue augmentin and azithromycin.       -Leukocytosis  Resolved. Cont antibiotics as outlined above.      - Sinus tachycardia:   Improved. 2/2 sepsis and asthma exacerbation. EKG on admission revealed anterior T wave inversions. T wave inversions no longer present on repeat EKG. Seri/al CE's are negative. Cont to monitor on telemetry.        -Post-partum state:    Echocardiogram ordered in the setting of dyspnea, although pt does not appear volume overloaded. Echo reveals an EF of 65%.        - Mildly elevated liver enzymes  Normalized. Viral hepatitis panel is non-reactive.      - Hx of substance abuse  Cont home Subutex        - Prophylaxis   DVT: SQ heparin  GI: Pepcid    Pt is at high risk 2/2 severe asthma exacerbation with respiratory failure at high risk for decompensation, severe sepsis and hx of substance abuse.       Sandie Hauser MD  08/14/17  9:48 AM

## 2017-08-14 NOTE — PROGRESS NOTES
LOS: 4 days     Chief Complaint:  Pulmonology is following for asthma exacerbation    Subjective     Interval History:     Mrs. Rosas is doing well this morning, she has been ambulating in her room without difficulty. She reports her breathing overall feels like it's improving. No acute events reported overnight.     History taken from: patient chart family RN    Review of Systems:   Review of Systems   Constitutional: Negative for chills, diaphoresis and fever.   HENT: Negative for congestion and sore throat.    Respiratory: Positive for cough (improving), shortness of breath (improving) and wheezing.    Cardiovascular: Negative for chest pain and leg swelling.   Gastrointestinal: Negative for abdominal distention and abdominal pain.   Genitourinary: Negative for difficulty urinating.   Musculoskeletal: Negative for arthralgias and myalgias.   Skin: Negative for color change and pallor.   Neurological: Negative for dizziness, tremors and weakness.   Psychiatric/Behavioral: Negative for agitation and behavioral problems.                     Objective     Vital Signs  Temp:  [97.6 °F (36.4 °C)-98 °F (36.7 °C)] 97.7 °F (36.5 °C)  Heart Rate:  [] 77  Resp:  [16-18] 18  BP: (106-132)/(59-90) 122/78  Body mass index is 17.22 kg/(m^2).    Intake/Output Summary (Last 24 hours) at 08/14/17 0859  Last data filed at 08/14/17 0802   Gross per 24 hour   Intake             6325 ml   Output             2400 ml   Net             3925 ml     I/O this shift:  In: 720 [P.O.:720]  Out: -     Physical Exam:  GENERAL APPEARANCE: Well developed, well nourished, alert and cooperative, and appears to be in no acute distress.    HEAD: normocephalic.    EYES: PERRL    NECK: Neck supple.     CARDIAC: Normal S1 and S2. No S3, S4 or murmurs. Rhythm is regular. There is no peripheral edema, cyanosis or pallor. Extremities are warm and well perfused. Capillary refill is less than 2 seconds. No carotid bruits. No JVD    Respiratory:  course wheezing throughout    GI: Positive bowel sounds. Soft, nondistended, nontender.     Musculoskeletal: No significant deformity or joint abnormality. No edema. Peripheral pulses intact.     NEUROLOGICAL: Strength and sensation symmetric and intact throughout.     PSYCHIATRIC: The mental examination revealed the patient was oriented to person, place, and time.                 Results Review:                I reviewed the patient's new clinical results.  I reviewed the patient's new imaging results and agree with the interpretation.    Results from last 7 days  Lab Units 08/14/17  0053 08/13/17  0433 08/12/17  0040   WBC 10*3/mm3 11.11 10.67 12.89*   HEMOGLOBIN g/dL 10.1* 10.3* 11.5*   PLATELETS 10*3/mm3 330 273 331       Results from last 7 days  Lab Units 08/14/17 0053 08/13/17 0433 08/12/17  0040  08/10/17  1238   SODIUM mmol/L 138 141 139  < > 140   POTASSIUM mmol/L 3.3* 4.1 4.4  < > 3.8   CHLORIDE mmol/L 106 106 109  < > 104   CO2 mmol/L 23.6* 24.7 23.1*  < > 26.6   BUN mg/dL 7 8 8  < > 9   CREATININE mg/dL 0.62 0.66 0.72  < > 0.71   CALCIUM mg/dL 9.5 9.5 9.8  < > 10.3*   GLUCOSE mg/dL 159* 98 107  < > 106   MAGNESIUM mg/dL  --  2.1 2.2  --  1.8   < > = values in this interval not displayed.  No results found for: INR, PROTIME    Results from last 7 days  Lab Units 08/14/17  0053 08/11/17  0519 08/10/17  1238   ALK PHOS U/L 91 92 139*   BILIRUBIN mg/dL 0.2 0.3 0.4   ALT (SGPT) U/L 12 16 21   AST (SGOT) U/L 24 26 34*       Results from last 7 days  Lab Units 08/13/17  0511   PH, ARTERIAL pH units 7.402   PO2 ART mm Hg 85.4   PCO2, ARTERIAL mm Hg 39.9   HCO3 ART mmol/L 24.3     Imaging Results (last 24 hours)     Procedure Component Value Units Date/Time    XR Chest AP [[id number]] Collected:  08/14/17 0657     Updated:  08/14/17 0659    Narrative:       EXAMINATION: XR CHEST AP-      CLINICAL INDICATION:     Respiratory failure; J45.901-Unspecified asthma  with (acute) exacerbation     TECHNIQUE:  XR CHEST  AP-      COMPARISON: 8/12/2017      FINDINGS:   Lungs are aerated.   Heart and mediastinal contours are unremarkable.   No pneumothorax.   No pleural effusion.   No acute osseous findings.            Impression:       Stable, unremarkable chest.     This report was finalized on 8/14/2017 6:57 AM by Dr. Tom Caldwell MD.                Medication Review:   Scheduled Medications:    amoxicillin-clavulanate 1 tablet Oral Q12H   azithromycin 500 mg Intravenous Q24H   budesonide 0.25 mg Nebulization BID - RT   buprenorphine 8 mg Sublingual Daily   docusate sodium 100 mg Oral BID   famotidine 20 mg Intravenous Q12H   heparin (porcine) 5,000 Units Subcutaneous Q12H   ipratropium-albuterol 3 mL Nebulization Q4H - RT   methylPREDNISolone sodium succinate 40 mg Intravenous Q12H   IV Fluids 1000 mL + additives 125 mL/hr Intravenous Daily   Pharmacy Meds to Bed Consult  Does not apply Daily   potassium chloride 40 mEq Oral Q4H     Continuous infusions:       Assessment/Plan     Asthma Exacerbation: improving. She has not been using Bipap, during rounds she is on room air, SP02 97% and denies shortness of breath. Continue steroids, scheduled inhalants and nebulizer's. Chest xray reviewed.Eospinophils 0.1.      Mycoplasma pneumonia: chest xray unremarkable, continue azithromycin, continue isolation per hospital protocol. WBC 11, afebrile. Will continue scheduled inhalants and nebulizer's.     UTI: continue Augmentin, tolerating well. No new issues.     Hypokalemia: potassium 3.3, being replaced per protocol.    Patient Active Problem List   Diagnosis Code   • Acute severe exacerbation of asthma J45.901   • Respiratory failure J96.90     Case discussed with patient and family, all questions answered to their satisfaction.     CONCHA Suresh  08/14/17  8:59 AM      Scribed for Dr. Meehan by CONCHA Greer.     IMarino M.D. attest that the above note accurately reflects the work and decisions made  by  me.  Patient was seen and evaluated by Dr. Meehan, including history of present illness, physical exam, assessment, and treatment plan.  The above note was reviewed and edited by Dr. Meehan.

## 2017-08-15 VITALS
TEMPERATURE: 98 F | SYSTOLIC BLOOD PRESSURE: 131 MMHG | WEIGHT: 100.3 LBS | HEART RATE: 106 BPM | BODY MASS INDEX: 17.13 KG/M2 | DIASTOLIC BLOOD PRESSURE: 93 MMHG | RESPIRATION RATE: 16 BRPM | OXYGEN SATURATION: 97 % | HEIGHT: 64 IN

## 2017-08-15 PROBLEM — J45.901 ACUTE SEVERE EXACERBATION OF ASTHMA: Status: RESOLVED | Noted: 2017-08-10 | Resolved: 2017-08-15

## 2017-08-15 PROBLEM — J96.90 RESPIRATORY FAILURE (HCC): Status: RESOLVED | Noted: 2017-08-10 | Resolved: 2017-08-15

## 2017-08-15 LAB
ANION GAP SERPL CALCULATED.3IONS-SCNC: 6.6 MMOL/L (ref 3.6–11.2)
BACTERIA SPEC AEROBE CULT: NORMAL
BACTERIA SPEC AEROBE CULT: NORMAL
BASOPHILS # BLD AUTO: 0.04 10*3/MM3 (ref 0–0.3)
BASOPHILS NFR BLD AUTO: 0.4 % (ref 0–2)
BUN BLD-MCNC: 8 MG/DL (ref 7–21)
BUN/CREAT SERPL: 12.9 (ref 7–25)
CALCIUM SPEC-SCNC: 9.9 MG/DL (ref 7.7–10)
CHLORIDE SERPL-SCNC: 104 MMOL/L (ref 99–112)
CO2 SERPL-SCNC: 25.4 MMOL/L (ref 24.3–31.9)
CREAT BLD-MCNC: 0.62 MG/DL (ref 0.43–1.29)
DEPRECATED RDW RBC AUTO: 46.5 FL (ref 37–54)
EOSINOPHIL # BLD AUTO: 0.08 10*3/MM3 (ref 0–0.7)
EOSINOPHIL NFR BLD AUTO: 0.8 % (ref 0–5)
ERYTHROCYTE [DISTWIDTH] IN BLOOD BY AUTOMATED COUNT: 14.6 % (ref 11.5–14.5)
GFR SERPL CREATININE-BSD FRML MDRD: 119 ML/MIN/1.73
GLUCOSE BLD-MCNC: 120 MG/DL (ref 70–110)
HCT VFR BLD AUTO: 33 % (ref 37–47)
HGB BLD-MCNC: 9.8 G/DL (ref 12–16)
IMM GRANULOCYTES # BLD: 0.03 10*3/MM3 (ref 0–0.03)
IMM GRANULOCYTES NFR BLD: 0.3 % (ref 0–0.5)
LYMPHOCYTES # BLD AUTO: 3.4 10*3/MM3 (ref 1–3)
LYMPHOCYTES NFR BLD AUTO: 35.2 % (ref 21–51)
MAGNESIUM SERPL-MCNC: 2 MG/DL (ref 1.7–2.6)
MCH RBC QN AUTO: 25.7 PG (ref 27–33)
MCHC RBC AUTO-ENTMCNC: 29.7 G/DL (ref 33–37)
MCV RBC AUTO: 86.4 FL (ref 80–94)
MONOCYTES # BLD AUTO: 0.99 10*3/MM3 (ref 0.1–0.9)
MONOCYTES NFR BLD AUTO: 10.2 % (ref 0–10)
NEUTROPHILS # BLD AUTO: 5.13 10*3/MM3 (ref 1.4–6.5)
NEUTROPHILS NFR BLD AUTO: 53.1 % (ref 30–70)
OSMOLALITY SERPL CALC.SUM OF ELEC: 271.5 MOSM/KG (ref 273–305)
PLATELET # BLD AUTO: 304 10*3/MM3 (ref 130–400)
PMV BLD AUTO: 10.1 FL (ref 6–10)
POTASSIUM BLD-SCNC: 4.3 MMOL/L (ref 3.5–5.3)
RBC # BLD AUTO: 3.82 10*6/MM3 (ref 4.2–5.4)
SODIUM BLD-SCNC: 136 MMOL/L (ref 135–153)
WBC NRBC COR # BLD: 9.67 10*3/MM3 (ref 4.5–12.5)

## 2017-08-15 PROCEDURE — 25010000002 HEPARIN (PORCINE) PER 1000 UNITS: Performed by: PHYSICIAN ASSISTANT

## 2017-08-15 PROCEDURE — 83735 ASSAY OF MAGNESIUM: CPT | Performed by: INTERNAL MEDICINE

## 2017-08-15 PROCEDURE — 99232 SBSQ HOSP IP/OBS MODERATE 35: CPT | Performed by: INTERNAL MEDICINE

## 2017-08-15 PROCEDURE — 25010000002 METHYLPREDNISOLONE PER 40 MG: Performed by: PHYSICIAN ASSISTANT

## 2017-08-15 PROCEDURE — 80048 BASIC METABOLIC PNL TOTAL CA: CPT | Performed by: NURSE PRACTITIONER

## 2017-08-15 PROCEDURE — 85025 COMPLETE CBC W/AUTO DIFF WBC: CPT | Performed by: PHYSICIAN ASSISTANT

## 2017-08-15 PROCEDURE — 25010000002 THIAMINE PER 100 MG: Performed by: INTERNAL MEDICINE

## 2017-08-15 PROCEDURE — 94799 UNLISTED PULMONARY SVC/PX: CPT

## 2017-08-15 PROCEDURE — 99239 HOSP IP/OBS DSCHRG MGMT >30: CPT | Performed by: INTERNAL MEDICINE

## 2017-08-15 PROCEDURE — 94760 N-INVAS EAR/PLS OXIMETRY 1: CPT

## 2017-08-15 RX ORDER — PREDNISONE 10 MG/1
20 TABLET ORAL DAILY
Qty: 9 TABLET | Refills: 0 | Status: SHIPPED | OUTPATIENT
Start: 2017-08-15 | End: 2017-09-14 | Stop reason: SDUPTHER

## 2017-08-15 RX ORDER — METHYLPREDNISOLONE SODIUM SUCCINATE 40 MG/ML
20 INJECTION, POWDER, LYOPHILIZED, FOR SOLUTION INTRAMUSCULAR; INTRAVENOUS EVERY 12 HOURS
Status: DISCONTINUED | OUTPATIENT
Start: 2017-08-15 | End: 2017-08-15 | Stop reason: HOSPADM

## 2017-08-15 RX ORDER — GUAIFENESIN/DEXTROMETHORPHAN 100-10MG/5
5 SYRUP ORAL EVERY 4 HOURS PRN
Qty: 100 ML | Refills: 0 | Status: SHIPPED | OUTPATIENT
Start: 2017-08-15

## 2017-08-15 RX ORDER — BUDESONIDE AND FORMOTEROL FUMARATE DIHYDRATE 80; 4.5 UG/1; UG/1
1 AEROSOL RESPIRATORY (INHALATION)
Qty: 10.2 G | Refills: 0 | Status: SHIPPED | OUTPATIENT
Start: 2017-08-15 | End: 2017-09-14 | Stop reason: SDUPTHER

## 2017-08-15 RX ORDER — BUDESONIDE 0.25 MG/2ML
0.25 INHALANT ORAL
Qty: 60 ML | Refills: 2 | Status: SHIPPED | OUTPATIENT
Start: 2017-08-15 | End: 2017-08-15

## 2017-08-15 RX ORDER — AMOXICILLIN AND CLAVULANATE POTASSIUM 500; 125 MG/1; MG/1
1 TABLET, FILM COATED ORAL EVERY 12 HOURS SCHEDULED
Qty: 10 TABLET | Refills: 0 | Status: SHIPPED | OUTPATIENT
Start: 2017-08-15

## 2017-08-15 RX ORDER — BENZONATATE 200 MG/1
200 CAPSULE ORAL 3 TIMES DAILY PRN
Qty: 10 CAPSULE | Refills: 0 | Status: SHIPPED | OUTPATIENT
Start: 2017-08-15

## 2017-08-15 RX ADMIN — DOCUSATE SODIUM 100 MG: 100 CAPSULE, LIQUID FILLED ORAL at 09:06

## 2017-08-15 RX ADMIN — FAMOTIDINE 20 MG: 10 INJECTION, SOLUTION INTRAVENOUS at 09:00

## 2017-08-15 RX ADMIN — METHYLPREDNISOLONE SODIUM SUCCINATE 40 MG: 40 INJECTION, POWDER, FOR SOLUTION INTRAMUSCULAR; INTRAVENOUS at 01:58

## 2017-08-15 RX ADMIN — BUDESONIDE 0.25 MG: 0.25 SUSPENSION RESPIRATORY (INHALATION) at 06:23

## 2017-08-15 RX ADMIN — IPRATROPIUM BROMIDE AND ALBUTEROL SULFATE 3 ML: .5; 3 SOLUTION RESPIRATORY (INHALATION) at 06:23

## 2017-08-15 RX ADMIN — BUPRENORPHINE HCL 8 MG: 2 TABLET SUBLINGUAL at 08:57

## 2017-08-15 RX ADMIN — AMOXICILLIN AND CLAVULANATE POTASSIUM 500 MG: 500; 125 TABLET, FILM COATED ORAL at 09:07

## 2017-08-15 RX ADMIN — HEPARIN SODIUM 5000 UNITS: 5000 INJECTION, SOLUTION INTRAVENOUS; SUBCUTANEOUS at 09:04

## 2017-08-15 RX ADMIN — IPRATROPIUM BROMIDE AND ALBUTEROL SULFATE 3 ML: .5; 3 SOLUTION RESPIRATORY (INHALATION) at 02:36

## 2017-08-15 RX ADMIN — IPRATROPIUM BROMIDE AND ALBUTEROL SULFATE 3 ML: .5; 3 SOLUTION RESPIRATORY (INHALATION) at 10:18

## 2017-08-15 RX ADMIN — THIAMINE HYDROCHLORIDE 125 ML/HR: 100 INJECTION, SOLUTION INTRAMUSCULAR; INTRAVENOUS at 08:54

## 2017-08-15 NOTE — PROGRESS NOTES
LOS: 5 days     Chief Complaint:  Pulmonology is following for asthma exacerbation     Subjective     Interval History:     Mrs. Rosas is doing well this morning, her breathing has improved, she has been moving about in her room without difficulty. No acute events reported overnight.     History taken from: patient chart RN    Review of Systems:   Review of Systems   Constitutional: Negative for appetite change, fatigue and fever.   HENT: Negative for congestion and rhinorrhea.    Respiratory: Negative for cough, shortness of breath, wheezing and stridor.    Cardiovascular: Negative for chest pain and leg swelling.   Gastrointestinal: Negative for abdominal distention and abdominal pain.   Genitourinary: Negative for difficulty urinating.   Musculoskeletal: Negative for arthralgias and myalgias.   Skin: Negative for color change and pallor.   Neurological: Negative for dizziness, tremors and weakness.   Psychiatric/Behavioral: Negative for agitation, behavioral problems and confusion.                     Objective     Vital Signs  Temp:  [97.6 °F (36.4 °C)-98.4 °F (36.9 °C)] 97.9 °F (36.6 °C)  Heart Rate:  [] 89  Resp:  [16-18] 16  BP: (109-126)/(69-88) 126/88  Body mass index is 17.22 kg/(m^2).    Intake/Output Summary (Last 24 hours) at 08/15/17 0935  Last data filed at 08/15/17 0854   Gross per 24 hour   Intake          4959.58 ml   Output              651 ml   Net          4308.58 ml     I/O this shift:  In: 2914.6 [I.V.:2914.6]  Out: -     Physical Exam:  GENERAL APPEARANCE: Well developed, well nourished, alert and cooperative, and appears to be in no acute distress.    HEAD: normocephalic.    EYES: PERRL    NECK: Neck supple.     CARDIAC: Normal S1 and S2. No S3, S4 or murmurs. Rhythm is regular. There is no peripheral edema, cyanosis or pallor. Extremities are warm and well perfused. Capillary refill is less than 2 seconds. No carotid bruits.    Respiratory: few scattered expiratory  wheezing    GI: Positive bowel sounds. Soft, nondistended, nontender.     Musculoskeletal: No significant deformity or joint abnormality. No edema. Peripheral pulses intact.     NEUROLOGICAL: Strength and sensation symmetric and intact throughout.     PSYCHIATRIC: The mental examination revealed the patient was oriented to person, place, and time.                 Results Review:                I reviewed the patient's new clinical results.  I reviewed the patient's new imaging results and agree with the interpretation.    Results from last 7 days  Lab Units 08/15/17  0103 08/14/17  0053 08/13/17  0433   WBC 10*3/mm3 9.67 11.11 10.67   HEMOGLOBIN g/dL 9.8* 10.1* 10.3*   PLATELETS 10*3/mm3 304 330 273       Results from last 7 days  Lab Units 08/15/17  0829 08/15/17  0103 08/14/17  1730 08/14/17  0053 08/13/17  0433 08/12/17  0040   SODIUM mmol/L 136  --   --  138 141 139   POTASSIUM mmol/L 4.3  --  4.2 3.3* 4.1 4.4   CHLORIDE mmol/L 104  --   --  106 106 109   CO2 mmol/L 25.4  --   --  23.6* 24.7 23.1*   BUN mg/dL 8  --   --  7 8 8   CREATININE mg/dL 0.62  --   --  0.62 0.66 0.72   CALCIUM mg/dL 9.9  --   --  9.5 9.5 9.8   GLUCOSE mg/dL 120*  --   --  159* 98 107   MAGNESIUM mg/dL  --  2.0  --   --  2.1 2.2     No results found for: INR, PROTIME    Results from last 7 days  Lab Units 08/14/17  0053 08/11/17  0519 08/10/17  1238   ALK PHOS U/L 91 92 139*   BILIRUBIN mg/dL 0.2 0.3 0.4   ALT (SGPT) U/L 12 16 21   AST (SGOT) U/L 24 26 34*       Results from last 7 days  Lab Units 08/13/17  0511   PH, ARTERIAL pH units 7.402   PO2 ART mm Hg 85.4   PCO2, ARTERIAL mm Hg 39.9   HCO3 ART mmol/L 24.3     Imaging Results (last 24 hours)     ** No results found for the last 24 hours. **             Medication Review:   Scheduled Medications:    amoxicillin-clavulanate 1 tablet Oral Q12H   budesonide 0.25 mg Nebulization BID - RT   buprenorphine 8 mg Sublingual Daily   docusate sodium 100 mg Oral BID   heparin (porcine) 5,000  Units Subcutaneous Q12H   ipratropium-albuterol 3 mL Nebulization Q4H - RT   methylPREDNISolone sodium succinate 20 mg Intravenous Q12H   IV Fluids 1000 mL + additives 125 mL/hr Intravenous Daily   Pharmacy Meds to Bed Consult  Does not apply Daily     Continuous infusions:       Assessment/Plan      Asthma Exacerbation: we will discontinue BiPap today she has not been using it. Wheezing has greatly improved today, Sp02 is 97%, denies shortness of breath. We will decrease her steroids to medrol 20 mg IV BID, continue scheduled inhalants and nebulizer's.     Mycoplasma Pneumonia: we will discontinue azithromycin, continue isolation. WBC WNL, afebrile, continue Augmentin.     Patient Active Problem List   Diagnosis Code   • Acute severe exacerbation of asthma J45.901   • Respiratory failure J96.90     Case discussed with patient and nurse, all questions answered to their satisfaction.     CONCHA Suresh  08/15/17  9:35 AM    Scribed for Dr. Meehan by CONCHA Greer.   IMarino M.D. attest that the above note accurately reflects the work and decisions made  by me.  Patient was seen and evaluated by Dr. Meehan, including history of present illness, physical exam, assessment, and treatment plan.  The above note was reviewed and edited by Dr. Meehan.

## 2017-08-15 NOTE — DISCHARGE INSTR - LAB
I spoke with suman at the [facility] and we made a follow up for the patient on 08/22/2017 at 10:00 am    I spoke with Tara at dr. Meehan's office and we made a follow up for the patient on 08/29/2017 at 01:00 pm

## 2017-08-15 NOTE — NURSING NOTE
INSTRUCTED VISITORS THAT PT. WAS IN DROPLET AND CONTACT ISOLATION AND NEEDED TO PUT ON GOWNS AND MASKS AND THAT THE SMALL CHILD DIDN'T NEED TO GO IN THERE. 2 YOUNG GIRLS AND ONE SMALL FEMALE CHILD.

## 2017-08-15 NOTE — DISCHARGE SUMMARY
[facility] HOSPITALIST MEDICINE DISCHARGE SUMMARY    Patient Identification:  Name:  Deborah Rosas  Age:  22 y.o.  Sex:  female  :  1995  MRN:  [id number]  Visit Number:  [id number]    Date of Admission: 8/10/2017  Date of Discharge:  8/15/2017     PCP: Vic Somers MD    DISCHARGE DIAGNOSIS  · Acute hypoxemic respiratory failure due to acute severe asthma  · Acute severe exacerbation of asthma  · Severe sepsis due to mycoplasma pneumonia  · Leukocytosis, resolved  · Sinus tachycardia, resolved  · Mildly elevated liver enzymes, normalized      CONSULTS   Pulmonology    PROCEDURES PERFORMED  None    REASON FOR ADMISSION  Patient is a 22 y.o. female presented to [facility] complaining of shortness of breath.  Please see the admitting history and physical for further details.       HOSPITAL COURSE   Patient had history of severe asthma and was diagnosed to have acute asthma exacerbation causing acute respiratory failure.  Patient was admitted to intensive care unit.    - Acute hypoxemic respiratory failure  Patient was found to have hypoxemia and metabolic acidosis.  She was put on nasal cannula and then BiPAP and had improvement in her hypoxemia.  Hypoxemia was secondary to acute asthma exacerbation.  Patient had improvement in her oxygenation and O2 saturation as her asthma exacerbation resolved.  She was able to maintain her saturation on room air on the day of discharge.      - Acute severe exacerbation of asthma  Patient had severe exacerbation of asthma and was given IV epinephrine and Ativan in the emergency department along with Solu-Medrol IV and nebulizations.  She was admitted to intensive care unit and continued on albuterol as well as budesonide nebulizations.  Pulmonology was consulted and she was seen by Dr. Meehan in consultation.  Patient had gradual improvement in her shortness of breath and bronchospasm.  Her steroids were changed to by mouth and she was  moved to the progressive care unit.  Patient had resolution of her asthma exacerbation and was back to her baseline.      - Severe sepsis  Patient had evidence of sepsis with tachycardia, lactic acidosis, leukocytosis and end organ dysfunction including respiratory failure.  A chest x-ray did not show any infiltrate but mycoplasma antibodies were positive.  Patient was continued on IV fluids as well as antibiotics and she had rapid resolution of her lactic acidosis and leukocytosis.  Her respiratory failure gradually improved as dictated above.  He remained afebrile with stable vital signs during the course of her hospitalization.       - Sinus tachycardia:   Patient had sinus tachycardia on admission 2/2 sepsis and asthma exacerbation. EKG on admission revealed anterior T wave inversions which resolved on repeat EKG.  Cardiac enzymes were negative.  Patient had improvement in her tachycardia with improvement in her hypoxemia, sepsis and asthma exacerbation.  Patient had 2-D echocardiogram that showed EF of 65%      - Mildly elevated liver enzymes  Normalized. Viral hepatitis panel was non-reactive.     Patient was able to ambulate without dyspnea or chest pain.  She was maintaining her saturation on ambulation on room air.  Patient is being discharged home in stable condition.  She was advised to follow-up with pulmonology and her primary care physician and also counseled on the importance of compliance with medications and follow-up with physicians.  Patient agreed.            DISCHARGE DISPOSITION   Stable    DISCHARGE MEDICATIONS:   Deborah Rosas   Home Medication Instructions ROULA:[id number]    Printed on:08/15/17 1204   Medication Information                      albuterol (PROVENTIL HFA;VENTOLIN HFA) 108 (90 BASE) MCG/ACT inhaler  Inhale 2 puffs Every 6 (Six) Hours As Needed for Wheezing.             amoxicillin-clavulanate (AUGMENTIN) 500-125 MG per tablet  Take 1 tablet by mouth Every 12 (Twelve)  Hours. Indications: Pneumonia             benzonatate (TESSALON) 200 MG capsule  Take 1 capsule by mouth 3 (Three) Times a Day As Needed for Cough.             budesonide (PULMICORT) 0.25 MG/2ML nebulizer solution  Take 2 mL by nebulization Daily.             buprenorphine (SUBUTEX) 8 MG sublingual tablet SL tablet  Place 10 mg under the tongue Daily.             guaifenesin-dextromethorphan (ROBITUSSIN DM) 100-10 MG/5ML syrup  Take 5 mL by mouth Every 4 (Four) Hours As Needed for Cough.             pneumococcal polysaccharide 23-valent (PNEUMOVAX-23) 25 MCG/0.5ML vaccine  Inject 0.5 mL into the shoulder, thigh, or buttocks During Hospitalization (if applicable.) for up to 1 dose.             predniSONE (DELTASONE) 10 MG tablet  Take 2 tablets by mouth Daily. Take 2 tab daily for 3 days then 1 tab daily for 3 days then stop.                 Diet Instructions     Diet: Regular; Thin       Discharge Diet:  Regular   Fluid Consistency:  Thin                 Activity Instructions     Activity as Tolerated                   Additional Instructions for the Follow-ups that You Need to Schedule     Additional Discharge Follow-Up (Specify Provider)    As directed    To:  Dr. Meehan   Follow Up:  2 Weeks       Discharge Follow-up with PCP    As directed    Follow Up Details:  1 week.             Follow-up Information     Follow up with Vic Somers MD .    Specialty:  Family Medicine    Why:  1 week.    Contact information:    [address] [place] [address] [address]  [facility] [id number]  [phone]            TEST  RESULTS PENDING AT DISCHARGE   Order Current Status    Blood Culture Preliminary result    Blood Culture Preliminary result           Sandie Hauser MD  08/15/17  12:06 PM    Please note that this discharge summary required more than 30 minutes to complete.    Please send a copy of this dictation to the following providers:    Vic Somers MD

## 2017-08-15 NOTE — PLAN OF CARE
Problem: Fall Risk (Adult)  Goal: Identify Related Risk Factors and Signs and Symptoms  Outcome: Ongoing (interventions implemented as appropriate)  Goal: Absence of Falls  Outcome: Ongoing (interventions implemented as appropriate)    Problem: NPPV/CPAP (Adult)  Goal: Signs and Symptoms of Listed Potential Problems Will be Absent or Manageable (NPPV/CPAP)  Outcome: Ongoing (interventions implemented as appropriate)

## 2017-09-14 ENCOUNTER — OFFICE VISIT (OUTPATIENT)
Dept: PULMONOLOGY | Facility: CLINIC | Age: 22
End: 2017-09-14

## 2017-09-14 ENCOUNTER — LAB (OUTPATIENT)
Dept: LAB | Facility: HOSPITAL | Age: 22
End: 2017-09-14

## 2017-09-14 VITALS
DIASTOLIC BLOOD PRESSURE: 62 MMHG | HEART RATE: 75 BPM | SYSTOLIC BLOOD PRESSURE: 120 MMHG | TEMPERATURE: 97.8 F | WEIGHT: 104 LBS | BODY MASS INDEX: 17.75 KG/M2 | HEIGHT: 64 IN | OXYGEN SATURATION: 93 %

## 2017-09-14 DIAGNOSIS — Z91.09 ENVIRONMENTAL ALLERGIES: ICD-10-CM

## 2017-09-14 DIAGNOSIS — J45.40 MODERATE PERSISTENT ASTHMA WITHOUT COMPLICATION: ICD-10-CM

## 2017-09-14 DIAGNOSIS — R06.02 SHORTNESS OF BREATH: ICD-10-CM

## 2017-09-14 DIAGNOSIS — R06.02 SHORTNESS OF BREATH: Primary | ICD-10-CM

## 2017-09-14 LAB
BASOPHILS # BLD AUTO: 0.04 10*3/MM3 (ref 0–0.3)
BASOPHILS NFR BLD AUTO: 0.6 % (ref 0–2)
DEPRECATED RDW RBC AUTO: 41.5 FL (ref 37–54)
EOSINOPHIL # BLD AUTO: 0.89 10*3/MM3 (ref 0–0.7)
EOSINOPHIL NFR BLD AUTO: 12.6 % (ref 0–5)
ERYTHROCYTE [DISTWIDTH] IN BLOOD BY AUTOMATED COUNT: 13.8 % (ref 11.5–14.5)
HCT VFR BLD AUTO: 37.9 % (ref 37–47)
HGB BLD-MCNC: 11.5 G/DL (ref 12–16)
IMM GRANULOCYTES # BLD: 0.01 10*3/MM3 (ref 0–0.03)
IMM GRANULOCYTES NFR BLD: 0.1 % (ref 0–0.5)
LYMPHOCYTES # BLD AUTO: 2.25 10*3/MM3 (ref 1–3)
LYMPHOCYTES NFR BLD AUTO: 31.9 % (ref 21–51)
MCH RBC QN AUTO: 25.1 PG (ref 27–33)
MCHC RBC AUTO-ENTMCNC: 30.3 G/DL (ref 33–37)
MCV RBC AUTO: 82.8 FL (ref 80–94)
MONOCYTES # BLD AUTO: 0.7 10*3/MM3 (ref 0.1–0.9)
MONOCYTES NFR BLD AUTO: 9.9 % (ref 0–10)
NEUTROPHILS # BLD AUTO: 3.17 10*3/MM3 (ref 1.4–6.5)
NEUTROPHILS NFR BLD AUTO: 44.9 % (ref 30–70)
PLATELET # BLD AUTO: 339 10*3/MM3 (ref 130–400)
PMV BLD AUTO: 11.4 FL (ref 6–10)
RBC # BLD AUTO: 4.58 10*6/MM3 (ref 4.2–5.4)
WBC NRBC COR # BLD: 7.06 10*3/MM3 (ref 4.5–12.5)

## 2017-09-14 PROCEDURE — 36415 COLL VENOUS BLD VENIPUNCTURE: CPT

## 2017-09-14 PROCEDURE — 82785 ASSAY OF IGE: CPT | Performed by: NURSE PRACTITIONER

## 2017-09-14 PROCEDURE — 85025 COMPLETE CBC W/AUTO DIFF WBC: CPT | Performed by: NURSE PRACTITIONER

## 2017-09-14 PROCEDURE — 96372 THER/PROPH/DIAG INJ SC/IM: CPT | Performed by: NURSE PRACTITIONER

## 2017-09-14 PROCEDURE — 99213 OFFICE O/P EST LOW 20 MIN: CPT | Performed by: NURSE PRACTITIONER

## 2017-09-14 RX ORDER — BUDESONIDE AND FORMOTEROL FUMARATE DIHYDRATE 80; 4.5 UG/1; UG/1
1 AEROSOL RESPIRATORY (INHALATION)
Qty: 10.2 G | Refills: 0 | Status: SHIPPED | OUTPATIENT
Start: 2017-09-14

## 2017-09-14 RX ORDER — DEXAMETHASONE SODIUM PHOSPHATE 4 MG/ML
4 INJECTION, SOLUTION INTRA-ARTICULAR; INTRALESIONAL; INTRAMUSCULAR; INTRAVENOUS; SOFT TISSUE ONCE
Status: COMPLETED | OUTPATIENT
Start: 2017-09-14 | End: 2017-09-15

## 2017-09-14 RX ORDER — ALBUTEROL SULFATE 90 UG/1
2 AEROSOL, METERED RESPIRATORY (INHALATION) EVERY 6 HOURS PRN
Qty: 18 G | Refills: 11 | Status: SHIPPED | OUTPATIENT
Start: 2017-09-14

## 2017-09-14 RX ORDER — DEXAMETHASONE SODIUM PHOSPHATE 4 MG/ML
4 INJECTION, SOLUTION INTRA-ARTICULAR; INTRALESIONAL; INTRAMUSCULAR; INTRAVENOUS; SOFT TISSUE ONCE
Status: DISCONTINUED | OUTPATIENT
Start: 2017-09-14 | End: 2017-09-14

## 2017-09-14 RX ORDER — PREDNISONE 10 MG/1
TABLET ORAL
Qty: 42 TABLET | Refills: 0 | Status: SHIPPED | OUTPATIENT
Start: 2017-09-14

## 2017-09-14 RX ADMIN — DEXAMETHASONE SODIUM PHOSPHATE 4 MG: 4 INJECTION, SOLUTION INTRA-ARTICULAR; INTRALESIONAL; INTRAMUSCULAR; INTRAVENOUS; SOFT TISSUE at 16:30

## 2017-09-14 NOTE — PROGRESS NOTES
Interval history since last visit: Wheezing and congestion    Recent hospitalizations: Yes    Investigations (imaging, PFT's, labs, sleep study, record requests, etc.) None    Have you had the Influenza Vaccine? no   Would you like to receive this Vaccine today? no    Have you had the Pneumonia Vaccine?  no  Would you like to receive this Vaccine today? no    Subjective    Deboarh Rosas presents for the following Hospital Follow Up      History of Present Illness     Ms. Rosas is here today for a hospital follow up.  She was seen in the hospital a few weeks ago with an asthma exacerbation.  She states that she used to see an allergist and be on Xolair.   She states that over the last few days she has has increased shortness of breath and wheezing.    Review of Systems   Constitutional: Negative for activity change, fatigue and unexpected weight change.   HENT: Positive for congestion. Negative for postnasal drip and rhinorrhea.    Respiratory: Positive for wheezing. Negative for apnea, cough, chest tightness and shortness of breath.    Cardiovascular: Negative for chest pain and palpitations.   Gastrointestinal: Negative for nausea.   Allergic/Immunologic: Negative for environmental allergies.   Psychiatric/Behavioral: Negative for agitation and confusion.       Active Problems:  Problem List Items Addressed This Visit     Shortness of breath - Primary    Relevant Orders    CBC & Differential    Environmental allergies    Relevant Orders    IgE Level    Moderate persistent asthma without complication    Relevant Medications    albuterol (PROVENTIL HFA;VENTOLIN HFA) 108 (90 Base) MCG/ACT inhaler    budesonide-formoterol (SYMBICORT) 80-4.5 MCG/ACT inhaler    Other Relevant Orders    IgE Level    CBC & Differential          Past Medical History:  Past Medical History:   Diagnosis Date   • Asthma    • Seizures     Seizures as a child with last seizure around 10 years ago       Family History:  Family History  "  Problem Relation Age of Onset   • COPD Mother        Social History:  Social History   Substance Use Topics   • Smoking status: Never Smoker   • Smokeless tobacco: Not on file   • Alcohol use No       Current Medications:  Current Outpatient Prescriptions   Medication Sig Dispense Refill   • albuterol (PROVENTIL HFA;VENTOLIN HFA) 108 (90 Base) MCG/ACT inhaler Inhale 2 puffs Every 6 (Six) Hours As Needed for Wheezing. 18 g 11   • amoxicillin-clavulanate (AUGMENTIN) 500-125 MG per tablet Take 1 tablet by mouth Every 12 (Twelve) Hours. Indications: Pneumonia 10 tablet 0   • benzonatate (TESSALON) 200 MG capsule Take 1 capsule by mouth 3 (Three) Times a Day As Needed for Cough. 10 capsule 0   • budesonide-formoterol (SYMBICORT) 80-4.5 MCG/ACT inhaler Inhale 1 puff 2 (Two) Times a Day. 10.2 g 0   • buprenorphine (SUBUTEX) 8 MG sublingual tablet SL tablet Place 10 mg under the tongue Daily.     • guaifenesin-dextromethorphan (ROBITUSSIN DM) 100-10 MG/5ML syrup Take 5 mL by mouth Every 4 (Four) Hours As Needed for Cough. 100 mL 0   • predniSONE (DELTASONE) 10 MG tablet See attached paper for directions. 42 tablet 0     No current facility-administered medications for this visit.        Allergies:  Allergies   Allergen Reactions   • Shellfish-Derived Products        Vitals:  /62  Pulse 75  Temp 97.8 °F (36.6 °C) (Oral)   Ht 64\" (162.6 cm)  Wt 104 lb (47.2 kg)  SpO2 93%  BMI 17.85 kg/m2    Imaging:    Imaging Results (most recent)     None          Pulmonary Functions Testing Results:    No results found for: FEV1, FVC, OVK1SPG, TLC, DLCO    Results for orders placed or performed during the hospital encounter of 08/10/17   Blood Culture   Result Value Ref Range    Blood Culture No growth at 5 days    Blood Culture   Result Value Ref Range    Blood Culture No growth at 5 days    Urine Culture   Result Value Ref Range    Urine Culture >100,000 CFU/mL Normal Urogenital Marlene    Comprehensive Metabolic Panel "   Result Value Ref Range    Glucose 106 70 - 110 mg/dL    BUN 9 7 - 21 mg/dL    Creatinine 0.71 0.43 - 1.29 mg/dL    Sodium 140 135 - 153 mmol/L    Potassium 3.8 3.5 - 5.3 mmol/L    Chloride 104 99 - 112 mmol/L    CO2 26.6 24.3 - 31.9 mmol/L    Calcium 10.3 (H) 7.7 - 10.0 mg/dL    Total Protein 9.3 (H) 6.0 - 8.0 g/dL    Albumin 5.30 (H) 3.50 - 5.00 g/dL    ALT (SGPT) 21 10 - 36 U/L    AST (SGOT) 34 (H) 10 - 30 U/L    Alkaline Phosphatase 139 (H) 35 - 104 U/L    Total Bilirubin 0.4 0.2 - 1.8 mg/dL    eGFR Non African Amer 102 >60 mL/min/1.73    Globulin 4.0 gm/dL    A/G Ratio 1.3 (L) 1.5 - 2.5 g/dL    BUN/Creatinine Ratio 12.7 7.0 - 25.0    Anion Gap 9.4 3.6 - 11.2 mmol/L   Lactic Acid, Plasma   Result Value Ref Range    Lactate 2.2 (C) 0.5 - 2.0 mmol/L   CBC Auto Differential   Result Value Ref Range    WBC 14.52 (H) 4.50 - 12.50 10*3/mm3    RBC 4.92 4.20 - 5.40 10*6/mm3    Hemoglobin 12.9 12.0 - 16.0 g/dL    Hematocrit 42.1 37.0 - 47.0 %    MCV 85.6 80.0 - 94.0 fL    MCH 26.2 (L) 27.0 - 33.0 pg    MCHC 30.6 (L) 33.0 - 37.0 g/dL    RDW 14.3 11.5 - 14.5 %    RDW-SD 45.0 37.0 - 54.0 fl    MPV 11.1 (H) 6.0 - 10.0 fL    Platelets 300 130 - 400 10*3/mm3    Neutrophil % 83.3 (H) 30.0 - 70.0 %    Lymphocyte % 6.7 (L) 21.0 - 51.0 %    Monocyte % 2.5 0.0 - 10.0 %    Eosinophil % 7.0 (H) 0.0 - 5.0 %    Basophil % 0.2 0.0 - 2.0 %    Immature Grans % 0.3 0.0 - 0.5 %    Neutrophils, Absolute 12.09 (H) 1.40 - 6.50 10*3/mm3    Lymphocytes, Absolute 0.98 (L) 1.00 - 3.00 10*3/mm3    Monocytes, Absolute 0.37 0.10 - 0.90 10*3/mm3    Eosinophils, Absolute 1.01 (H) 0.00 - 0.70 10*3/mm3    Basophils, Absolute 0.03 0.00 - 0.30 10*3/mm3    Immature Grans, Absolute 0.04 (H) 0.00 - 0.03 10*3/mm3   Lactate Acid, Reflex   Result Value Ref Range    Lactate 2.0 0.5 - 2.0 mmol/L   Osmolality, Calculated   Result Value Ref Range    Osmolality Calc 278.5 273.0 - 305.0 mOsm/kg   Blood Gas, Arterial With Co-Ox   Result Value Ref Range    Site  Arterial: left brachial     Gary's Test N/A     pH, Arterial 7.346 (L) 7.350 - 7.450 pH units    pCO2, Arterial 40.6 35.0 - 45.0 mm Hg    pO2, Arterial 69.4 (L) 80.0 - 100.0 mm Hg    HCO3, Arterial 21.7 (L) 22.0 - 26.0 mmol/L    Base Excess, Arterial -3.7 mmol/L    O2 Saturation, Arterial 91.7 90.0 - 100.0 %    Hemoglobin, Blood Gas 12.4 12 - 16 g/dL    Hematocrit, Blood Gas 36.0 (L) 37.0 - 47.0 %    Oxyhemoglobin 90.0 85 - 100 %    Methemoglobin 0.50 0.00 - 3.00 %    Carboxyhemoglobin 1.4 0 - 5 %    A-a Gradiant 25.4 0.0 - 300.0 mmHg    Temperature 98.6 C    Barometric Pressure for Blood Gas 730 mmHg    Modality Room air     FIO2 21 %   Basic Metabolic Panel   Result Value Ref Range    Glucose 133 (H) 70 - 110 mg/dL    BUN 8 7 - 21 mg/dL    Creatinine 0.60 0.43 - 1.29 mg/dL    Sodium 140 135 - 153 mmol/L    Potassium 4.0 3.5 - 5.3 mmol/L    Chloride 109 99 - 112 mmol/L    CO2 24.2 (L) 24.3 - 31.9 mmol/L    Calcium 8.9 7.7 - 10.0 mg/dL    eGFR Non African Amer 124 >60 mL/min/1.73    BUN/Creatinine Ratio 13.3 7.0 - 25.0    Anion Gap 6.8 3.6 - 11.2 mmol/L   Magnesium   Result Value Ref Range    Magnesium 1.8 1.7 - 2.6 mg/dL   Legionella Antigen, Urine   Result Value Ref Range    LEGIONELLA ANTIGEN, URINE Negative Negative   Mycoplasma Pneumoniae Antibody, IgM   Result Value Ref Range    Mycoplasma pneumo IgM Positive (C) Negative   Urinalysis With / Culture If Indicated   Result Value Ref Range    Color, UA Yellow Yellow, Straw    Appearance, UA Cloudy (A) Clear    pH, UA 6.5 5.0 - 8.0    Specific Gravity, UA 1.015 1.005 - 1.030    Glucose, UA Negative Negative    Ketones, UA 15 mg/dL (1+) (A) Negative    Bilirubin, UA Negative Negative    Blood, UA Negative Negative    Protein, UA Negative Negative    Leuk Esterase, UA Large (3+) (A) Negative    Nitrite, UA Negative Negative    Urobilinogen, UA 0.2 E.U./dL 0.2 - 1.0 E.U./dL   Pregnancy, Urine   Result Value Ref Range    HCG, Urine QL Negative Negative   Urine Drug  Screen   Result Value Ref Range    Amphetamine Screen, Urine Negative Negative    Barbiturates Screen, Urine Negative Negative    Benzodiazepine Screen, Urine Positive (A) Negative    Cocaine Screen, Urine Negative Negative    Methadone Screen, Urine Negative Negative    Opiate Screen Negative Negative    Phencyclidine (PCP), Urine Negative Negative    THC, Screen, Urine Negative Negative    6-ACETYL MORPHINE Negative Negative    Buprenorphine, Screen, Urine Positive (A) Negative    Oxycodone Screen, Urine Negative Negative   Comprehensive Metabolic Panel   Result Value Ref Range    Glucose 125 (H) 70 - 110 mg/dL    BUN 6 (L) 7 - 21 mg/dL    Creatinine 0.56 0.43 - 1.29 mg/dL    Sodium 139 135 - 153 mmol/L    Potassium 4.1 3.5 - 5.3 mmol/L    Chloride 111 99 - 112 mmol/L    CO2 21.4 (L) 24.3 - 31.9 mmol/L    Calcium 8.9 7.7 - 10.0 mg/dL    Total Protein 6.7 6.0 - 8.0 g/dL    Albumin 3.80 3.50 - 5.00 g/dL    ALT (SGPT) 16 10 - 36 U/L    AST (SGOT) 26 10 - 30 U/L    Alkaline Phosphatase 92 35 - 104 U/L    Total Bilirubin 0.3 0.2 - 1.8 mg/dL    eGFR Non African Amer 134 >60 mL/min/1.73    Globulin 2.9 gm/dL    A/G Ratio 1.3 (L) 1.5 - 2.5 g/dL    BUN/Creatinine Ratio 10.7 7.0 - 25.0    Anion Gap 6.6 3.6 - 11.2 mmol/L   CK   Result Value Ref Range    Creatine Kinase 303 (H) 24 - 173 U/L   CK   Result Value Ref Range    Creatine Kinase 282 (H) 24 - 173 U/L   Troponin   Result Value Ref Range    Troponin I <0.006 <=0.040 ng/mL   Troponin   Result Value Ref Range    Troponin I <0.006 <=0.040 ng/mL   Hepatitis Panel, Acute   Result Value Ref Range    Hepatitis B Surface Ag Non-Reactive Non-Reactive    Hep A IgM Non-Reactive Non-Reactive    Hep B C IgM Non-Reactive Non-Reactive    Hepatitis C Ab Non-Reactive Non-Reactive   CBC Auto Differential   Result Value Ref Range    WBC 9.33 4.50 - 12.50 10*3/mm3    RBC 3.79 (L) 4.20 - 5.40 10*6/mm3    Hemoglobin 9.9 (L) 12.0 - 16.0 g/dL    Hematocrit 32.6 (L) 37.0 - 47.0 %    MCV 86.0  80.0 - 94.0 fL    MCH 26.1 (L) 27.0 - 33.0 pg    MCHC 30.4 (L) 33.0 - 37.0 g/dL    RDW 14.2 11.5 - 14.5 %    RDW-SD 43.7 37.0 - 54.0 fl    MPV 10.9 (H) 6.0 - 10.0 fL    Platelets 266 130 - 400 10*3/mm3    Neutrophil % 67.6 30.0 - 70.0 %    Lymphocyte % 21.3 21.0 - 51.0 %    Monocyte % 10.7 (H) 0.0 - 10.0 %    Eosinophil % 0.2 0.0 - 5.0 %    Basophil % 0.1 0.0 - 2.0 %    Immature Grans % 0.1 0.0 - 0.5 %    Neutrophils, Absolute 6.30 1.40 - 6.50 10*3/mm3    Lymphocytes, Absolute 1.99 1.00 - 3.00 10*3/mm3    Monocytes, Absolute 1.00 (H) 0.10 - 0.90 10*3/mm3    Eosinophils, Absolute 0.02 0.00 - 0.70 10*3/mm3    Basophils, Absolute 0.01 0.00 - 0.30 10*3/mm3    Immature Grans, Absolute 0.01 0.00 - 0.03 10*3/mm3   CBC Auto Differential   Result Value Ref Range    WBC 9.90 4.50 - 12.50 10*3/mm3    RBC 3.80 (L) 4.20 - 5.40 10*6/mm3    Hemoglobin 9.8 (L) 12.0 - 16.0 g/dL    Hematocrit 33.1 (L) 37.0 - 47.0 %    MCV 87.1 80.0 - 94.0 fL    MCH 25.8 (L) 27.0 - 33.0 pg    MCHC 29.6 (L) 33.0 - 37.0 g/dL    RDW 14.5 11.5 - 14.5 %    RDW-SD 44.5 37.0 - 54.0 fl    MPV 10.9 (H) 6.0 - 10.0 fL    Platelets 245 130 - 400 10*3/mm3    Neutrophil % 90.9 (H) 30.0 - 70.0 %    Lymphocyte % 7.8 (L) 21.0 - 51.0 %    Monocyte % 1.0 0.0 - 10.0 %    Eosinophil % 0.1 0.0 - 5.0 %    Basophil % 0.1 0.0 - 2.0 %    Immature Grans % 0.1 0.0 - 0.5 %    Neutrophils, Absolute 9.00 (H) 1.40 - 6.50 10*3/mm3    Lymphocytes, Absolute 0.77 (L) 1.00 - 3.00 10*3/mm3    Monocytes, Absolute 0.10 0.10 - 0.90 10*3/mm3    Eosinophils, Absolute 0.01 0.00 - 0.70 10*3/mm3    Basophils, Absolute 0.01 0.00 - 0.30 10*3/mm3    Immature Grans, Absolute 0.01 0.00 - 0.03 10*3/mm3   Blood Gas, Arterial   Result Value Ref Range    Site Arterial: left brachial     Gary's Test N/A     pH, Arterial 7.351 7.350 - 7.450 pH units    pCO2, Arterial 36.3 35.0 - 45.0 mm Hg    pO2, Arterial 89.0 80.0 - 100.0 mm Hg    HCO3, Arterial 19.6 (C) 22.0 - 26.0 mmol/L    Base Excess, Arterial -5.3  mmol/L    O2 Saturation, Arterial 96.1 90.0 - 100.0 %    Hemoglobin, Blood Gas 10.8 (L) 12 - 16 g/dL    Hematocrit, Blood Gas 32.0 (L) 37.0 - 47.0 %    Oxyhemoglobin 95.0 85 - 100 %    Methemoglobin 0.30 0.00 - 3.00 %    Carboxyhemoglobin 0.8 0 - 5 %    A-a Gradiant 59.4 0.0 - 300.0 mmHg    Temperature 98.6 C    Barometric Pressure for Blood Gas 730 mmHg    Modality Nasal Cannula     FIO2 28 %   Urinalysis, Microscopic Only   Result Value Ref Range    RBC, UA 0-2 (A) None Seen /HPF    WBC, UA 31-50 (A) None Seen /HPF    Bacteria, UA 1+ (A) None Seen /HPF    Squamous Epithelial Cells, UA 3-6 (A) None Seen, 0-2 /HPF    Hyaline Casts, UA None Seen None Seen /LPF    Methodology Automated Microscopy    Osmolality, Calculated   Result Value Ref Range    Osmolality Calc 279.6 273.0 - 305.0 mOsm/kg   CK   Result Value Ref Range    Creatine Kinase 266 (H) 24 - 173 U/L   Troponin   Result Value Ref Range    Troponin I <0.006 <=0.040 ng/mL   Osmolality, Calculated   Result Value Ref Range    Osmolality Calc 276.6 273.0 - 305.0 mOsm/kg   Phosphorus   Result Value Ref Range    Phosphorus 3.4 2.7 - 4.5 mg/dL   Basic Metabolic Panel   Result Value Ref Range    Glucose 107 70 - 110 mg/dL    BUN 8 7 - 21 mg/dL    Creatinine 0.72 0.43 - 1.29 mg/dL    Sodium 139 135 - 153 mmol/L    Potassium 4.4 3.5 - 5.3 mmol/L    Chloride 109 99 - 112 mmol/L    CO2 23.1 (L) 24.3 - 31.9 mmol/L    Calcium 9.8 7.7 - 10.0 mg/dL    eGFR Non African Amer 100 >60 mL/min/1.73    BUN/Creatinine Ratio 11.1 7.0 - 25.0    Anion Gap 6.9 3.6 - 11.2 mmol/L   C-reactive Protein   Result Value Ref Range    C-Reactive Protein <0.50 0.00 - 0.99 mg/dL   Magnesium   Result Value Ref Range    Magnesium 2.2 1.7 - 2.6 mg/dL   CBC Auto Differential   Result Value Ref Range    WBC 12.89 (H) 4.50 - 12.50 10*3/mm3    RBC 4.36 4.20 - 5.40 10*6/mm3    Hemoglobin 11.5 (L) 12.0 - 16.0 g/dL    Hematocrit 37.7 37.0 - 47.0 %    MCV 86.5 80.0 - 94.0 fL    MCH 26.4 (L) 27.0 - 33.0 pg     MCHC 30.5 (L) 33.0 - 37.0 g/dL    RDW 14.8 (H) 11.5 - 14.5 %    RDW-SD 45.7 37.0 - 54.0 fl    MPV 10.9 (H) 6.0 - 10.0 fL    Platelets 331 130 - 400 10*3/mm3    Neutrophil % 79.4 (H) 30.0 - 70.0 %    Lymphocyte % 13.4 (L) 21.0 - 51.0 %    Monocyte % 6.9 0.0 - 10.0 %    Eosinophil % 0.0 0.0 - 5.0 %    Basophil % 0.1 0.0 - 2.0 %    Immature Grans % 0.2 0.0 - 0.5 %    Neutrophils, Absolute 10.23 (H) 1.40 - 6.50 10*3/mm3    Lymphocytes, Absolute 1.73 1.00 - 3.00 10*3/mm3    Monocytes, Absolute 0.89 0.10 - 0.90 10*3/mm3    Eosinophils, Absolute 0.00 0.00 - 0.70 10*3/mm3    Basophils, Absolute 0.01 0.00 - 0.30 10*3/mm3    Immature Grans, Absolute 0.03 0.00 - 0.03 10*3/mm3   Osmolality, Calculated   Result Value Ref Range    Osmolality Calc 276.3 273.0 - 305.0 mOsm/kg   Basic Metabolic Panel   Result Value Ref Range    Glucose 98 70 - 110 mg/dL    BUN 8 7 - 21 mg/dL    Creatinine 0.66 0.43 - 1.29 mg/dL    Sodium 141 135 - 153 mmol/L    Potassium 4.1 3.5 - 5.3 mmol/L    Chloride 106 99 - 112 mmol/L    CO2 24.7 24.3 - 31.9 mmol/L    Calcium 9.5 7.7 - 10.0 mg/dL    eGFR Non African Amer 111 >60 mL/min/1.73    BUN/Creatinine Ratio 12.1 7.0 - 25.0    Anion Gap 10.3 3.6 - 11.2 mmol/L   Magnesium   Result Value Ref Range    Magnesium 2.1 1.7 - 2.6 mg/dL   Blood Gas, Arterial   Result Value Ref Range    Site Arterial: left radial     Gary's Test Positive     pH, Arterial 7.402 7.350 - 7.450 pH units    pCO2, Arterial 39.9 35.0 - 45.0 mm Hg    pO2, Arterial 85.4 80.0 - 100.0 mm Hg    HCO3, Arterial 24.3 22.0 - 26.0 mmol/L    Base Excess, Arterial -0.4 mmol/L    O2 Saturation, Arterial 96.1 90.0 - 100.0 %    Hemoglobin, Blood Gas 10.7 (L) 12 - 16 g/dL    Hematocrit, Blood Gas 31.0 (L) 37.0 - 47.0 %    Oxyhemoglobin 95.1 85 - 100 %    Methemoglobin 0.20 0.00 - 3.00 %    Carboxyhemoglobin 0.8 0 - 5 %    A-a Gradiant 9.4 0.0 - 300.0 mmHg    Temperature 98.6 C    Barometric Pressure for Blood Gas 726 mmHg    Modality RA     FIO2 21 %    CBC Auto Differential   Result Value Ref Range    WBC 10.67 4.50 - 12.50 10*3/mm3    RBC 3.94 (L) 4.20 - 5.40 10*6/mm3    Hemoglobin 10.3 (L) 12.0 - 16.0 g/dL    Hematocrit 34.4 (L) 37.0 - 47.0 %    MCV 87.3 80.0 - 94.0 fL    MCH 26.1 (L) 27.0 - 33.0 pg    MCHC 29.9 (L) 33.0 - 37.0 g/dL    RDW 14.7 (H) 11.5 - 14.5 %    RDW-SD 46.1 37.0 - 54.0 fl    MPV 10.4 (H) 6.0 - 10.0 fL    Platelets 273 130 - 400 10*3/mm3    Neutrophil % 76.2 (H) 30.0 - 70.0 %    Lymphocyte % 17.6 (L) 21.0 - 51.0 %    Monocyte % 5.2 0.0 - 10.0 %    Eosinophil % 0.6 0.0 - 5.0 %    Basophil % 0.2 0.0 - 2.0 %    Immature Grans % 0.2 0.0 - 0.5 %    Neutrophils, Absolute 8.14 (H) 1.40 - 6.50 10*3/mm3    Lymphocytes, Absolute 1.88 1.00 - 3.00 10*3/mm3    Monocytes, Absolute 0.55 0.10 - 0.90 10*3/mm3    Eosinophils, Absolute 0.06 0.00 - 0.70 10*3/mm3    Basophils, Absolute 0.02 0.00 - 0.30 10*3/mm3    Immature Grans, Absolute 0.02 0.00 - 0.03 10*3/mm3   Osmolality, Calculated   Result Value Ref Range    Osmolality Calc 279.6 273.0 - 305.0 mOsm/kg   Comprehensive Metabolic Panel   Result Value Ref Range    Glucose 159 (H) 70 - 110 mg/dL    BUN 7 7 - 21 mg/dL    Creatinine 0.62 0.43 - 1.29 mg/dL    Sodium 138 135 - 153 mmol/L    Potassium 3.3 (L) 3.5 - 5.3 mmol/L    Chloride 106 99 - 112 mmol/L    CO2 23.6 (L) 24.3 - 31.9 mmol/L    Calcium 9.5 7.7 - 10.0 mg/dL    Total Protein 7.4 6.0 - 8.0 g/dL    Albumin 4.20 3.50 - 5.00 g/dL    ALT (SGPT) 12 10 - 36 U/L    AST (SGOT) 24 10 - 30 U/L    Alkaline Phosphatase 91 35 - 104 U/L    Total Bilirubin 0.2 0.2 - 1.8 mg/dL    eGFR Non African Amer 119 >60 mL/min/1.73    Globulin 3.2 gm/dL    A/G Ratio 1.3 (L) 1.5 - 2.5 g/dL    BUN/Creatinine Ratio 11.3 7.0 - 25.0    Anion Gap 8.4 3.6 - 11.2 mmol/L   CBC Auto Differential   Result Value Ref Range    WBC 11.11 4.50 - 12.50 10*3/mm3    RBC 3.88 (L) 4.20 - 5.40 10*6/mm3    Hemoglobin 10.1 (L) 12.0 - 16.0 g/dL    Hematocrit 33.6 (L) 37.0 - 47.0 %    MCV 86.6 80.0 -  94.0 fL    MCH 26.0 (L) 27.0 - 33.0 pg    MCHC 30.1 (L) 33.0 - 37.0 g/dL    RDW 14.4 11.5 - 14.5 %    RDW-SD 44.6 37.0 - 54.0 fl    MPV 10.2 (H) 6.0 - 10.0 fL    Platelets 330 130 - 400 10*3/mm3    Neutrophil % 66.2 30.0 - 70.0 %    Lymphocyte % 24.9 21.0 - 51.0 %    Monocyte % 8.3 0.0 - 10.0 %    Eosinophil % 0.1 0.0 - 5.0 %    Basophil % 0.3 0.0 - 2.0 %    Immature Grans % 0.2 0.0 - 0.5 %    Neutrophils, Absolute 7.36 (H) 1.40 - 6.50 10*3/mm3    Lymphocytes, Absolute 2.77 1.00 - 3.00 10*3/mm3    Monocytes, Absolute 0.92 (H) 0.10 - 0.90 10*3/mm3    Eosinophils, Absolute 0.01 0.00 - 0.70 10*3/mm3    Basophils, Absolute 0.03 0.00 - 0.30 10*3/mm3    Immature Grans, Absolute 0.02 0.00 - 0.03 10*3/mm3   Osmolality, Calculated   Result Value Ref Range    Osmolality Calc 277.0 273.0 - 305.0 mOsm/kg   Potassium   Result Value Ref Range    Potassium 4.2 3.5 - 5.3 mmol/L   Magnesium   Result Value Ref Range    Magnesium 2.0 1.7 - 2.6 mg/dL   CBC Auto Differential   Result Value Ref Range    WBC 9.67 4.50 - 12.50 10*3/mm3    RBC 3.82 (L) 4.20 - 5.40 10*6/mm3    Hemoglobin 9.8 (L) 12.0 - 16.0 g/dL    Hematocrit 33.0 (L) 37.0 - 47.0 %    MCV 86.4 80.0 - 94.0 fL    MCH 25.7 (L) 27.0 - 33.0 pg    MCHC 29.7 (L) 33.0 - 37.0 g/dL    RDW 14.6 (H) 11.5 - 14.5 %    RDW-SD 46.5 37.0 - 54.0 fl    MPV 10.1 (H) 6.0 - 10.0 fL    Platelets 304 130 - 400 10*3/mm3    Neutrophil % 53.1 30.0 - 70.0 %    Lymphocyte % 35.2 21.0 - 51.0 %    Monocyte % 10.2 (H) 0.0 - 10.0 %    Eosinophil % 0.8 0.0 - 5.0 %    Basophil % 0.4 0.0 - 2.0 %    Immature Grans % 0.3 0.0 - 0.5 %    Neutrophils, Absolute 5.13 1.40 - 6.50 10*3/mm3    Lymphocytes, Absolute 3.40 (H) 1.00 - 3.00 10*3/mm3    Monocytes, Absolute 0.99 (H) 0.10 - 0.90 10*3/mm3    Eosinophils, Absolute 0.08 0.00 - 0.70 10*3/mm3    Basophils, Absolute 0.04 0.00 - 0.30 10*3/mm3    Immature Grans, Absolute 0.03 0.00 - 0.03 10*3/mm3   Basic Metabolic Panel   Result Value Ref Range    Glucose 120 (H) 70 -  110 mg/dL    BUN 8 7 - 21 mg/dL    Creatinine 0.62 0.43 - 1.29 mg/dL    Sodium 136 135 - 153 mmol/L    Potassium 4.3 3.5 - 5.3 mmol/L    Chloride 104 99 - 112 mmol/L    CO2 25.4 24.3 - 31.9 mmol/L    Calcium 9.9 7.7 - 10.0 mg/dL    eGFR Non African Amer 119 >60 mL/min/1.73    BUN/Creatinine Ratio 12.9 7.0 - 25.0    Anion Gap 6.6 3.6 - 11.2 mmol/L   Osmolality, Calculated   Result Value Ref Range    Osmolality Calc 271.5 (L) 273.0 - 305.0 mOsm/kg   Adult Transthoracic Echo Complete   Result Value Ref Range    BSA 1.5 m^2     CV ECHO YANNA - RVDD 1.8 cm    IVSd 0.63 cm    LVIDd 4.2 cm    LVIDs 3.2 cm    LVPWd 0.7 cm    IVS/LVPW 0.89     FS 23.7 %    EDV(Teich) 76.8 ml    ESV(Teich) 40.1 ml    EF(Teich) 47.7 %    EDV(cubed) 71.9 ml    ESV(cubed) 31.9 ml    EF(cubed) 55.6 %    LV mass(C)d 78.6 grams    LV mass(C)dI 52.4 grams/m^2    SV(Teich) 36.7 ml    SI(Teich) 24.4 ml/m^2    SV(cubed) 40.0 ml    SI(cubed) 26.7 ml/m^2    Ao root diam 2.2 cm    Ao root area 3.9 cm^2    LA dimension 2.6 cm    LA/Ao 1.1     LVLd ap4 7.5 cm    EDV(MOD-sp4) 57.0 ml    LVLs ap4 5.4 cm    ESV(MOD-sp4) 18.0 ml    EF(MOD-sp4) 68.4 %    SV(MOD-sp4) 39.0 ml    SI(MOD-sp4) 26.0 ml/m^2    Ao root area (BSA corrected) 1.5     CONTRAST EF 4CH 68.4 ml/m^2    LV Diastolic corrected for BSA 38.0 ml/m^2    LV Systolic corrected for BSA 12.0 ml/m^2    MV E max kamala 109.8 cm/sec    MV A max kamala 112.3 cm/sec    MV E/A 0.98     MV V2 max 111.1 cm/sec    MV max PG 4.9 mmHg    MV V2 mean 76.7 cm/sec    MV mean PG 2.7 mmHg    MV V2 VTI 19.7 cm    MV dec time 0.14 sec    Ao pk kamala 148.3 cm/sec    Ao max PG 8.8 mmHg    Ao max PG (full) 3.9 mmHg    Ao V2 mean 91.4 cm/sec    Ao mean PG 4.0 mmHg    Ao mean PG (full) 1.1 mmHg    Ao V2 VTI 22.9 cm    LV V1 max PG 4.9 mmHg    LV V1 mean PG 2.8 mmHg    LV V1 max 110.4 cm/sec    LV V1 mean 77.6 cm/sec    LV V1 VTI 18.3 cm    SV(Ao) 89.2 ml    SI(Ao) 59.5 ml/m^2    PA V2 max 119.4 cm/sec    PA max PG 5.7 mmHg    PA V2  mean 78.1 cm/sec    PA mean PG 2.9 mmHg    PA V2 VTI 19.8 cm    PA acc slope 474.2 cm/sec^2    PA acc time 0.17 sec    TR max kamala 229.2 cm/sec    RVSP(TR) 31.0 mmHg    RAP systole 10.0 mmHg    PA pr(Accel) 4.5 mmHg     CV ECHO YANNA - BZI_BMI 18.4 kilograms/m^2     CV ECHO YANNA - BSA(HAYCOCK) 1.5 m^2     CV ECHO YANNA - BZI_METRIC_WEIGHT 48.5 kg     CV ECHO YANNA - BZI_METRIC_HEIGHT 162.6 cm    Echo EF Estimated 65 %   Light Blue Top   Result Value Ref Range    Extra Tube hold for add-on    Green Top (Gel)   Result Value Ref Range    Extra Tube Hold for add-ons.    Lavender Top   Result Value Ref Range    Extra Tube hold for add-on    Gold Top - SST   Result Value Ref Range    Extra Tube Hold for add-ons.        Objective   Physical Exam     GENERAL APPEARANCE: Well developed, well nourished, alert and cooperative, and appears to be in no acute distress.    HEAD: normocephalic.    EYES: PERRL, EOMI. Fundi normal, vision is grossly intact.    THROAT: Oral cavity and pharynx normal. No inflammation, swelling, exudate, or lesions.     NECK: Neck supple.     CARDIAC: Normal S1 and S2. No S3, S4 or murmurs. Rhythm is regular. There is no peripheral edema, cyanosis or pallor. Extremities are warm and well perfused. Capillary refill is less than 2 seconds. No carotid bruits.    RESPIRATORY: Clear to auscultation without rales, rhonchi, wheezing or diminished breath sounds.    GI: Positive bowel sounds. Soft, nondistended, nontender.     MUSCULOSKELETAL: No significant deformity or joint abnormality. No edema. Peripheral pulses intact. No varicosities.    NEUROLOGICAL: Strength and sensation symmetric and intact throughout.     PSYCHIATRIC: The mental examination revealed the patient was oriented to person, place, and time.       Assessment/Plan     Asthma: Patient states that she is taking Dulera but that she doesn't feel like it is helping.  Will start her on Symbicort. Samples given and instructions  explained.    Will obtained IgE level and CBC to see if patient is a candidate for Xolair.    Asthma exacerbation:  Patient states that her breathing has been bad for the last few days.  Will start her on a prednisone taper.  Will give patient a decadron shot in the office after she has her blood drawn.    Told her to take her albuterol neb treatments routinely for the next few days.  Educated her that if her breathing got worse to go to the ER for further evaluation and treatment.              ICD-10-CM ICD-9-CM   1. Shortness of breath R06.02 786.05   2. Moderate persistent asthma without complication J45.40 493.90   3. Environmental allergies Z91.09 V15.09       Return in about 4 weeks (around 10/12/2017).

## 2017-09-15 LAB — TOTAL IGE SMQN RAST: 266 IU/ML (ref 0–100)

## 2017-09-15 RX ADMIN — DEXAMETHASONE SODIUM PHOSPHATE 4 MG: 4 INJECTION, SOLUTION INTRA-ARTICULAR; INTRALESIONAL; INTRAMUSCULAR; INTRAVENOUS; SOFT TISSUE at 14:50

## 2017-09-21 RX ORDER — DEXAMETHASONE SODIUM PHOSPHATE 4 MG/ML
4 INJECTION, SOLUTION INTRA-ARTICULAR; INTRALESIONAL; INTRAMUSCULAR; INTRAVENOUS; SOFT TISSUE ONCE
Status: COMPLETED | OUTPATIENT
Start: 2017-09-14 | End: 2017-09-14

## 2017-09-22 ENCOUNTER — HOSPITAL ENCOUNTER (EMERGENCY)
Facility: HOSPITAL | Age: 22
Discharge: HOME | End: 2017-09-23
Attending: EMERGENCY MEDICINE | Admitting: EMERGENCY MEDICINE

## 2017-09-22 ENCOUNTER — APPOINTMENT (OUTPATIENT)
Dept: GENERAL RADIOLOGY | Facility: HOSPITAL | Age: 22
End: 2017-09-22

## 2017-09-22 DIAGNOSIS — J45.901 ASTHMA EXACERBATION: Primary | ICD-10-CM

## 2017-09-22 LAB
A-A DO2: 21.7 MMHG (ref 0–300)
ARTERIAL PATENCY WRIST A: ABNORMAL
ATMOSPHERIC PRESS: 728 MMHG
BASE EXCESS BLDA CALC-SCNC: 0.3 MMOL/L
BASOPHILS # BLD AUTO: 0.07 10*3/MM3 (ref 0–0.3)
BASOPHILS NFR BLD AUTO: 0.7 % (ref 0–2)
BDY SITE: ABNORMAL
BODY TEMPERATURE: 98.6 C
COHGB MFR BLD: 1.4 % (ref 0–5)
DEPRECATED RDW RBC AUTO: 41.3 FL (ref 37–54)
EOSINOPHIL # BLD AUTO: 1.41 10*3/MM3 (ref 0–0.7)
EOSINOPHIL NFR BLD AUTO: 13.7 % (ref 0–5)
ERYTHROCYTE [DISTWIDTH] IN BLOOD BY AUTOMATED COUNT: 13.9 % (ref 11.5–14.5)
HCO3 BLDA-SCNC: 27.1 MMOL/L (ref 22–26)
HCT VFR BLD AUTO: 39.1 % (ref 37–47)
HCT VFR BLD CALC: 38 % (ref 37–47)
HGB BLD-MCNC: 12.1 G/DL (ref 12–16)
HGB BLDA-MCNC: 12.9 G/DL (ref 12–16)
HOROWITZ INDEX BLD+IHG-RTO: 21 %
IMM GRANULOCYTES # BLD: 0.01 10*3/MM3 (ref 0–0.03)
IMM GRANULOCYTES NFR BLD: 0.1 % (ref 0–0.5)
LYMPHOCYTES # BLD AUTO: 3.42 10*3/MM3 (ref 1–3)
LYMPHOCYTES NFR BLD AUTO: 33.3 % (ref 21–51)
MCH RBC QN AUTO: 25.7 PG (ref 27–33)
MCHC RBC AUTO-ENTMCNC: 30.9 G/DL (ref 33–37)
MCV RBC AUTO: 83.2 FL (ref 80–94)
METHGB BLD QL: 0.5 % (ref 0–3)
MODALITY: ABNORMAL
MONOCYTES # BLD AUTO: 0.95 10*3/MM3 (ref 0.1–0.9)
MONOCYTES NFR BLD AUTO: 9.3 % (ref 0–10)
NEUTROPHILS # BLD AUTO: 4.4 10*3/MM3 (ref 1.4–6.5)
NEUTROPHILS NFR BLD AUTO: 42.9 % (ref 30–70)
OXYHGB MFR BLDV: 84.3 % (ref 85–100)
PCO2 BLDA: 53.5 MM HG (ref 35–45)
PH BLDA: 7.32 PH UNITS (ref 7.35–7.45)
PLATELET # BLD AUTO: 394 10*3/MM3 (ref 130–400)
PMV BLD AUTO: 10.6 FL (ref 6–10)
PO2 BLDA: 57.2 MM HG (ref 80–100)
RBC # BLD AUTO: 4.7 10*6/MM3 (ref 4.2–5.4)
SAO2 % BLDCOA: 85.9 % (ref 90–100)
WBC NRBC COR # BLD: 10.26 10*3/MM3 (ref 4.5–12.5)

## 2017-09-22 PROCEDURE — 71020 HC CHEST PA AND LATERAL: CPT

## 2017-09-22 PROCEDURE — 94799 UNLISTED PULMONARY SVC/PX: CPT

## 2017-09-22 PROCEDURE — 71020 XR CHEST 2 VW: CPT | Performed by: RADIOLOGY

## 2017-09-22 PROCEDURE — 94640 AIRWAY INHALATION TREATMENT: CPT

## 2017-09-22 PROCEDURE — 36600 WITHDRAWAL OF ARTERIAL BLOOD: CPT | Performed by: EMERGENCY MEDICINE

## 2017-09-22 PROCEDURE — 99285 EMERGENCY DEPT VISIT HI MDM: CPT

## 2017-09-22 PROCEDURE — 82375 ASSAY CARBOXYHB QUANT: CPT | Performed by: EMERGENCY MEDICINE

## 2017-09-22 PROCEDURE — 83050 HGB METHEMOGLOBIN QUAN: CPT | Performed by: EMERGENCY MEDICINE

## 2017-09-22 PROCEDURE — 36415 COLL VENOUS BLD VENIPUNCTURE: CPT

## 2017-09-22 PROCEDURE — 85025 COMPLETE CBC W/AUTO DIFF WBC: CPT | Performed by: EMERGENCY MEDICINE

## 2017-09-22 PROCEDURE — 82805 BLOOD GASES W/O2 SATURATION: CPT | Performed by: EMERGENCY MEDICINE

## 2017-09-22 RX ORDER — IPRATROPIUM BROMIDE AND ALBUTEROL SULFATE 2.5; .5 MG/3ML; MG/3ML
3 SOLUTION RESPIRATORY (INHALATION) ONCE
Status: COMPLETED | OUTPATIENT
Start: 2017-09-22 | End: 2017-09-22

## 2017-09-22 RX ORDER — SODIUM CHLORIDE 0.9 % (FLUSH) 0.9 %
10 SYRINGE (ML) INJECTION AS NEEDED
Status: DISCONTINUED | OUTPATIENT
Start: 2017-09-22 | End: 2017-09-23 | Stop reason: HOSPADM

## 2017-09-22 RX ORDER — METHYLPREDNISOLONE SODIUM SUCCINATE 125 MG/2ML
125 INJECTION, POWDER, LYOPHILIZED, FOR SOLUTION INTRAMUSCULAR; INTRAVENOUS ONCE
Status: COMPLETED | OUTPATIENT
Start: 2017-09-22 | End: 2017-09-23

## 2017-09-22 RX ADMIN — IPRATROPIUM BROMIDE AND ALBUTEROL SULFATE 3 ML: .5; 3 SOLUTION RESPIRATORY (INHALATION) at 23:03

## 2017-09-23 VITALS
BODY MASS INDEX: 18.27 KG/M2 | HEIGHT: 64 IN | SYSTOLIC BLOOD PRESSURE: 111 MMHG | DIASTOLIC BLOOD PRESSURE: 55 MMHG | TEMPERATURE: 97.6 F | OXYGEN SATURATION: 95 % | WEIGHT: 107 LBS | HEART RATE: 77 BPM | RESPIRATION RATE: 20 BRPM

## 2017-09-23 PROCEDURE — 96374 THER/PROPH/DIAG INJ IV PUSH: CPT

## 2017-09-23 PROCEDURE — 94799 UNLISTED PULMONARY SVC/PX: CPT

## 2017-09-23 PROCEDURE — 25010000002 METHYLPREDNISOLONE PER 125 MG: Performed by: EMERGENCY MEDICINE

## 2017-09-23 RX ORDER — PREDNISONE 20 MG/1
20 TABLET ORAL 3 TIMES DAILY
Qty: 15 TABLET | Refills: 0 | Status: SHIPPED | OUTPATIENT
Start: 2017-09-23 | End: 2017-09-28

## 2017-09-23 RX ORDER — ALBUTEROL SULFATE 2.5 MG/3ML
2.5 SOLUTION RESPIRATORY (INHALATION) ONCE
Status: COMPLETED | OUTPATIENT
Start: 2017-09-23 | End: 2017-09-23

## 2017-09-23 RX ORDER — AZITHROMYCIN 250 MG/1
TABLET, FILM COATED ORAL
Qty: 6 TABLET | Refills: 0 | Status: SHIPPED | OUTPATIENT
Start: 2017-09-23

## 2017-09-23 RX ORDER — ALBUTEROL SULFATE 1.25 MG/3ML
1 SOLUTION RESPIRATORY (INHALATION) EVERY 4 HOURS PRN
Qty: 50 VIAL | Refills: 0 | Status: SHIPPED | OUTPATIENT
Start: 2017-09-23

## 2017-09-23 RX ADMIN — ALBUTEROL SULFATE 2.5 MG: 2.5 SOLUTION RESPIRATORY (INHALATION) at 01:01

## 2017-09-23 RX ADMIN — METHYLPREDNISOLONE SODIUM SUCCINATE 125 MG: 125 INJECTION, POWDER, FOR SOLUTION INTRAMUSCULAR; INTRAVENOUS at 00:04

## 2017-09-23 NOTE — ED NOTES
Patient is leaving ED with family at this time. Patient has no further needs or questions at discharge, patient verbalizes understanding of discharge instructions and importance of follow up care. Patient instructed to return to the ED if symptoms worsen or new symptoms arise. Patient is alert and oriented x4, respirations are even and unlabored, NADN.     Srini Jolly RN  09/23/17 0227

## 2017-09-23 NOTE — ED PROVIDER NOTES
Subjective   HPI Comments: Pt with known asthma comes in with worsening SOA and wheezing.  Numerous allergic triggers    Patient is a 22 y.o. female presenting with wheezing.   History provided by:  Patient and EMS personnel  Wheezing   Severity:  Severe  Severity compared to prior episodes:  Similar  Onset quality:  Gradual  Timing:  Constant  Progression:  Worsening  Chronicity:  Chronic  Context: not emotional upset, not exercise, not smoke exposure and not tartrazine    Relieved by:  Nothing  Ineffective treatments:  Nebulizer treatments  Associated symptoms: chest tightness and shortness of breath    Associated symptoms: no chest pain, no cough, no ear pain, no fatigue, no fever, no foot swelling, no headaches, no orthopnea, no PND, no rash, no rhinorrhea, no sore throat, no sputum production, no stridor and no swollen glands    Risk factors: not exposed to toxic fumes, no smoke inhalation and no suspected foreign body        Review of Systems   Constitutional: Negative.  Negative for fatigue and fever.   HENT: Negative.  Negative for congestion, ear pain, rhinorrhea, sinus pain, sinus pressure, sore throat, trouble swallowing and voice change.    Eyes: Negative.    Respiratory: Positive for chest tightness, shortness of breath and wheezing. Negative for cough, sputum production and stridor.    Cardiovascular: Negative.  Negative for chest pain, orthopnea and PND.   Gastrointestinal: Negative.    Endocrine: Negative.    Genitourinary: Negative.    Musculoskeletal: Negative.    Skin: Negative.  Negative for rash.   Allergic/Immunologic: Positive for environmental allergies. Negative for immunocompromised state.   Neurological: Negative.  Negative for headaches.   Hematological: Negative.    Psychiatric/Behavioral: Negative.    All other systems reviewed and are negative.      Past Medical History:   Diagnosis Date   • Asthma    • Seizures     Seizures as a child with last seizure around 10 years ago        Allergies   Allergen Reactions   • Shellfish-Derived Products        Past Surgical History:   Procedure Laterality Date   • HERNIA REPAIR         Family History   Problem Relation Age of Onset   • COPD Mother        Social History     Social History   • Marital status:      Spouse name: N/A   • Number of children: N/A   • Years of education: N/A     Social History Main Topics   • Smoking status: Never Smoker   • Smokeless tobacco: None   • Alcohol use No   • Drug use: No   • Sexual activity: Not Asked     Other Topics Concern   • None     Social History Narrative           Objective   Physical Exam   Constitutional: She is oriented to person, place, and time. She appears well-developed and well-nourished. She appears distressed.   HENT:   Head: Normocephalic and atraumatic.   Right Ear: External ear normal.   Left Ear: External ear normal.   Nose: Nose normal.   Mouth/Throat: Oropharynx is clear and moist.   Eyes: Conjunctivae and EOM are normal. Pupils are equal, round, and reactive to light. Right eye exhibits no discharge. Left eye exhibits no discharge. No scleral icterus.   Neck: Normal range of motion. No JVD present. No tracheal deviation present. No thyromegaly present.   Cardiovascular: Regular rhythm, normal heart sounds and intact distal pulses.  Exam reveals no gallop and no friction rub.    No murmur heard.  Sinus tachycardia   Pulmonary/Chest: No stridor. She is in respiratory distress. She has wheezes.   Labored resps.  Prolonged expiration.  Accessory muscle use.  Coarse inspiratory and expiratory wheezing   Abdominal: Soft. She exhibits no distension and no mass. There is no tenderness. There is no guarding.   Genitourinary:   Genitourinary Comments: No CVAT   Musculoskeletal: Normal range of motion. She exhibits no edema or deformity.   Lymphadenopathy:     She has no cervical adenopathy.   Neurological: She is alert and oriented to person, place, and time. She exhibits normal muscle  tone. Coordination normal.   Skin: Skin is warm and dry. No pallor.   Psychiatric:   Anxious   Nursing note and vitals reviewed.      Procedures         ED Course  ED Course      XR Chest 2 View   ED Interpretation   PA and lateral chest x-ray   My read   No apparent acute infiltrate or injury        Labs Reviewed   BLOOD GAS, ARTERIAL - Abnormal; Notable for the following:        Result Value    pH, Arterial 7.323 (*)     pCO2, Arterial 53.5 (*)     pO2, Arterial 57.2 (*)     HCO3, Arterial 27.1 (*)     O2 Saturation, Arterial 85.9 (*)     Oxyhemoglobin 84.3 (*)     All other components within normal limits   CBC WITH AUTO DIFFERENTIAL - Abnormal; Notable for the following:     MCH 25.7 (*)     MCHC 30.9 (*)     MPV 10.6 (*)     Eosinophil % 13.7 (*)     Lymphocytes, Absolute 3.42 (*)     Monocytes, Absolute 0.95 (*)     Eosinophils, Absolute 1.41 (*)     All other components within normal limits   CBC AND DIFFERENTIAL    Narrative:     The following orders were created for panel order CBC & Differential.  Procedure                               Abnormality         Status                     ---------                               -----------         ------                     CBC Auto Differential[[id number]]        Abnormal            Final result                 Please view results for these tests on the individual orders.        Medication List      START taking these medications          azithromycin 250 MG tablet   Commonly known as:  ZITHROMAX   Take 2 tablets the first day, then 1 tablet daily for 4 days.         CHANGE how you take these medications          * albuterol 108 (90 Base) MCG/ACT inhaler   Commonly known as:  PROVENTIL HFA;VENTOLIN HFA   Inhale 2 puffs Every 6 (Six) Hours As Needed for Wheezing.   What changed:  Another medication with the same name was added. Make sure   you understand how and when to take each.       * albuterol 1.25 MG/3ML nebulizer solution   Commonly known as:  ACCUNEB    Take 3 mL by nebulization Every 4 (Four) Hours As Needed for Wheezing or   Shortness of Air.   What changed:  You were already taking a medication with the same name,   and this prescription was added. Make sure you understand how and when to   take each.       predniSONE 20 MG tablet   Commonly known as:  DELTASONE   Take 1 tablet by mouth 3 (Three) Times a Day for 5 days.   What changed:    - medication strength  - how much to take  - how to take this  - when to take this       * Notice:  This list has 2 medication(s) that are the same as other   medications prescribed for you. Read the directions carefully, and ask   your doctor or other care provider to review them with you.      CONTINUE taking these medications          benzonatate 200 MG capsule   Commonly known as:  TESSALON   Take 1 capsule by mouth 3 (Three) Times a Day As Needed for Cough.       budesonide-formoterol 80-4.5 MCG/ACT inhaler   Commonly known as:  SYMBICORT   Inhale 1 puff 2 (Two) Times a Day.       buprenorphine 8 MG sublingual tablet SL tablet   Commonly known as:  SUBUTEX       ipratropium 0.02 % nebulizer solution   Commonly known as:  ATROVENT       ROBAFEN DM  MG/5ML syrup   Generic drug:  dextromethorphan-guaifenesin   Take 5 mL by mouth Every 4 (Four) Hours As Needed for Cough.         STOP taking these medications          amoxicillin-clavulanate 500-125 MG per tablet   Commonly known as:  AUGMENTIN                 MDM  Number of Diagnoses or Management Options  Asthma exacerbation: established and worsening     Amount and/or Complexity of Data Reviewed  Clinical lab tests: ordered and reviewed  Tests in the radiology section of CPT®: ordered and reviewed  Independent visualization of images, tracings, or specimens: yes    Risk of Complications, Morbidity, and/or Mortality  Presenting problems: high  Diagnostic procedures: moderate  Management options: moderate    Patient Progress  Patient progress: improved      Final  diagnoses:   Asthma exacerbation            Olvin Perales MD  09/23/17 0126       Olvin Perales MD  09/23/17 021

## 2017-10-29 ENCOUNTER — HOSPITAL ENCOUNTER (EMERGENCY)
Facility: HOSPITAL | Age: 22
Discharge: HOME | End: 2017-10-29
Attending: EMERGENCY MEDICINE | Admitting: EMERGENCY MEDICINE

## 2017-10-29 VITALS
DIASTOLIC BLOOD PRESSURE: 88 MMHG | TEMPERATURE: 97.4 F | WEIGHT: 110 LBS | HEIGHT: 64 IN | HEART RATE: 98 BPM | SYSTOLIC BLOOD PRESSURE: 128 MMHG | BODY MASS INDEX: 18.78 KG/M2 | RESPIRATION RATE: 18 BRPM | OXYGEN SATURATION: 100 %

## 2017-10-29 DIAGNOSIS — G40.909 SEIZURE DISORDER (HCC): Primary | ICD-10-CM

## 2017-10-29 LAB
6-ACETYL MORPHINE: NEGATIVE
ALBUMIN SERPL-MCNC: 4.2 G/DL (ref 3.5–5)
ALBUMIN/GLOB SERPL: 1.2 G/DL (ref 1.5–2.5)
ALP SERPL-CCNC: 96 U/L (ref 35–104)
ALT SERPL W P-5'-P-CCNC: 15 U/L (ref 10–36)
AMPHET+METHAMPHET UR QL: NEGATIVE
ANION GAP SERPL CALCULATED.3IONS-SCNC: 3.9 MMOL/L (ref 3.6–11.2)
AST SERPL-CCNC: 22 U/L (ref 10–30)
B-HCG UR QL: NEGATIVE
BARBITURATES UR QL SCN: NEGATIVE
BASOPHILS # BLD AUTO: 0.04 10*3/MM3 (ref 0–0.3)
BASOPHILS NFR BLD AUTO: 0.4 % (ref 0–2)
BENZODIAZ UR QL SCN: POSITIVE
BILIRUB SERPL-MCNC: 0.2 MG/DL (ref 0.2–1.8)
BILIRUB UR QL STRIP: NEGATIVE
BUN BLD-MCNC: 6 MG/DL (ref 7–21)
BUN/CREAT SERPL: 9 (ref 7–25)
BUPRENORPHINE SERPL-MCNC: POSITIVE NG/ML
CALCIUM SPEC-SCNC: 9.2 MG/DL (ref 7.7–10)
CANNABINOIDS SERPL QL: NEGATIVE
CHLORIDE SERPL-SCNC: 110 MMOL/L (ref 99–112)
CK SERPL-CCNC: 105 U/L (ref 24–173)
CLARITY UR: CLEAR
CO2 SERPL-SCNC: 30.1 MMOL/L (ref 24.3–31.9)
COCAINE UR QL: NEGATIVE
COLOR UR: YELLOW
CREAT BLD-MCNC: 0.67 MG/DL (ref 0.43–1.29)
D-LACTATE SERPL-SCNC: 1.7 MMOL/L (ref 0.5–2)
DEPRECATED RDW RBC AUTO: 43 FL (ref 37–54)
EOSINOPHIL # BLD AUTO: 0.98 10*3/MM3 (ref 0–0.7)
EOSINOPHIL NFR BLD AUTO: 10.8 % (ref 0–5)
ERYTHROCYTE [DISTWIDTH] IN BLOOD BY AUTOMATED COUNT: 14.8 % (ref 11.5–14.5)
ETHANOL BLD-MCNC: <10 MG/DL
ETHANOL UR QL: <0.01 %
GFR SERPL CREATININE-BSD FRML MDRD: 109 ML/MIN/1.73
GLOBULIN UR ELPH-MCNC: 3.6 GM/DL
GLUCOSE BLD-MCNC: 133 MG/DL (ref 70–110)
GLUCOSE UR STRIP-MCNC: NEGATIVE MG/DL
HCT VFR BLD AUTO: 40.5 % (ref 37–47)
HGB BLD-MCNC: 12.8 G/DL (ref 12–16)
HGB UR QL STRIP.AUTO: NEGATIVE
IMM GRANULOCYTES # BLD: 0.01 10*3/MM3 (ref 0–0.03)
IMM GRANULOCYTES NFR BLD: 0.1 % (ref 0–0.5)
KETONES UR QL STRIP: NEGATIVE
LEUKOCYTE ESTERASE UR QL STRIP.AUTO: NEGATIVE
LYMPHOCYTES # BLD AUTO: 2.53 10*3/MM3 (ref 1–3)
LYMPHOCYTES NFR BLD AUTO: 27.9 % (ref 21–51)
MCH RBC QN AUTO: 25.4 PG (ref 27–33)
MCHC RBC AUTO-ENTMCNC: 31.6 G/DL (ref 33–37)
MCV RBC AUTO: 80.5 FL (ref 80–94)
METHADONE UR QL SCN: NEGATIVE
MONOCYTES # BLD AUTO: 0.97 10*3/MM3 (ref 0.1–0.9)
MONOCYTES NFR BLD AUTO: 10.7 % (ref 0–10)
NEUTROPHILS # BLD AUTO: 4.55 10*3/MM3 (ref 1.4–6.5)
NEUTROPHILS NFR BLD AUTO: 50.1 % (ref 30–70)
NITRITE UR QL STRIP: NEGATIVE
OPIATES UR QL: NEGATIVE
OSMOLALITY SERPL CALC.SUM OF ELEC: 286.4 MOSM/KG (ref 273–305)
OXYCODONE UR QL SCN: NEGATIVE
PCP UR QL SCN: NEGATIVE
PH UR STRIP.AUTO: 6 [PH] (ref 5–8)
PLATELET # BLD AUTO: 363 10*3/MM3 (ref 130–400)
PMV BLD AUTO: 11.3 FL (ref 6–10)
POTASSIUM BLD-SCNC: 3.8 MMOL/L (ref 3.5–5.3)
PROT SERPL-MCNC: 7.8 G/DL (ref 6–8)
PROT UR QL STRIP: NEGATIVE
RBC # BLD AUTO: 5.03 10*6/MM3 (ref 4.2–5.4)
SODIUM BLD-SCNC: 144 MMOL/L (ref 135–153)
SP GR UR STRIP: 1.01 (ref 1–1.03)
UROBILINOGEN UR QL STRIP: NORMAL
WBC NRBC COR # BLD: 9.08 10*3/MM3 (ref 4.5–12.5)

## 2017-10-29 PROCEDURE — 80307 DRUG TEST PRSMV CHEM ANLYZR: CPT | Performed by: EMERGENCY MEDICINE

## 2017-10-29 PROCEDURE — 96360 HYDRATION IV INFUSION INIT: CPT

## 2017-10-29 PROCEDURE — 99284 EMERGENCY DEPT VISIT MOD MDM: CPT

## 2017-10-29 PROCEDURE — 85025 COMPLETE CBC W/AUTO DIFF WBC: CPT | Performed by: EMERGENCY MEDICINE

## 2017-10-29 PROCEDURE — 81003 URINALYSIS AUTO W/O SCOPE: CPT | Performed by: EMERGENCY MEDICINE

## 2017-10-29 PROCEDURE — 82550 ASSAY OF CK (CPK): CPT | Performed by: EMERGENCY MEDICINE

## 2017-10-29 PROCEDURE — 80053 COMPREHEN METABOLIC PANEL: CPT | Performed by: EMERGENCY MEDICINE

## 2017-10-29 PROCEDURE — 83605 ASSAY OF LACTIC ACID: CPT | Performed by: EMERGENCY MEDICINE

## 2017-10-29 PROCEDURE — 81025 URINE PREGNANCY TEST: CPT | Performed by: EMERGENCY MEDICINE

## 2017-10-29 RX ORDER — SODIUM CHLORIDE 9 MG/ML
125 INJECTION, SOLUTION INTRAVENOUS CONTINUOUS
Status: DISCONTINUED | OUTPATIENT
Start: 2017-10-29 | End: 2017-10-29

## 2017-10-29 RX ORDER — SODIUM CHLORIDE 0.9 % (FLUSH) 0.9 %
10 SYRINGE (ML) INJECTION AS NEEDED
Status: DISCONTINUED | OUTPATIENT
Start: 2017-10-29 | End: 2017-10-29 | Stop reason: HOSPADM

## 2017-10-29 RX ORDER — ALBUTEROL SULFATE 90 UG/1
2-4 AEROSOL, METERED RESPIRATORY (INHALATION) EVERY 4 HOURS PRN
Qty: 1 INHALER | Refills: 0 | Status: SHIPPED | OUTPATIENT
Start: 2017-10-29

## 2017-10-29 RX ADMIN — SODIUM CHLORIDE 500 ML: 9 INJECTION, SOLUTION INTRAVENOUS at 03:03

## 2017-12-18 ENCOUNTER — HOSPITAL ENCOUNTER (EMERGENCY)
Facility: HOSPITAL | Age: 22
Discharge: HOME | End: 2017-12-19
Attending: EMERGENCY MEDICINE | Admitting: EMERGENCY MEDICINE

## 2017-12-18 ENCOUNTER — APPOINTMENT (OUTPATIENT)
Dept: GENERAL RADIOLOGY | Facility: HOSPITAL | Age: 22
End: 2017-12-18

## 2017-12-18 DIAGNOSIS — N39.0 URINARY TRACT INFECTION WITHOUT HEMATURIA, SITE UNSPECIFIED: ICD-10-CM

## 2017-12-18 DIAGNOSIS — J45.901 MODERATE ASTHMA WITH EXACERBATION, UNSPECIFIED WHETHER PERSISTENT: Primary | ICD-10-CM

## 2017-12-18 LAB
A-A DO2: 42 MMHG (ref 0–300)
ARTERIAL PATENCY WRIST A: POSITIVE
ATMOSPHERIC PRESS: 726 MMHG
BASE EXCESS BLDA CALC-SCNC: 0.8 MMOL/L
BASOPHILS # BLD AUTO: 0.05 10*3/MM3 (ref 0–0.3)
BASOPHILS NFR BLD AUTO: 0.3 % (ref 0–2)
BDY SITE: ABNORMAL
BODY TEMPERATURE: 98.6 C
COHGB MFR BLD: 1.5 % (ref 0–5)
DEPRECATED RDW RBC AUTO: 49.3 FL (ref 37–54)
EOSINOPHIL # BLD AUTO: 1.57 10*3/MM3 (ref 0–0.7)
EOSINOPHIL NFR BLD AUTO: 10.4 % (ref 0–5)
ERYTHROCYTE [DISTWIDTH] IN BLOOD BY AUTOMATED COUNT: 16.2 % (ref 11.5–14.5)
HCO3 BLDA-SCNC: 27 MMOL/L (ref 22–26)
HCT VFR BLD AUTO: 43.6 % (ref 37–47)
HCT VFR BLD CALC: 39 % (ref 37–47)
HGB BLD-MCNC: 13.9 G/DL (ref 12–16)
HGB BLDA-MCNC: 13.3 G/DL (ref 12–16)
HOROWITZ INDEX BLD+IHG-RTO: 24 %
IMM GRANULOCYTES # BLD: 0.04 10*3/MM3 (ref 0–0.03)
IMM GRANULOCYTES NFR BLD: 0.3 % (ref 0–0.5)
LYMPHOCYTES # BLD AUTO: 1.94 10*3/MM3 (ref 1–3)
LYMPHOCYTES NFR BLD AUTO: 12.8 % (ref 21–51)
MCH RBC QN AUTO: 26.5 PG (ref 27–33)
MCHC RBC AUTO-ENTMCNC: 31.9 G/DL (ref 33–37)
MCV RBC AUTO: 83 FL (ref 80–94)
METHGB BLD QL: 0.6 % (ref 0–3)
MODALITY: ABNORMAL
MONOCYTES # BLD AUTO: 1.01 10*3/MM3 (ref 0.1–0.9)
MONOCYTES NFR BLD AUTO: 6.7 % (ref 0–10)
NEUTROPHILS # BLD AUTO: 10.55 10*3/MM3 (ref 1.4–6.5)
NEUTROPHILS NFR BLD AUTO: 69.5 % (ref 30–70)
OXYHGB MFR BLDV: 89.5 % (ref 85–100)
PCO2 BLDA: 49.5 MM HG (ref 35–45)
PH BLDA: 7.36 PH UNITS (ref 7.35–7.45)
PLATELET # BLD AUTO: 372 10*3/MM3 (ref 130–400)
PMV BLD AUTO: 10.8 FL (ref 6–10)
PO2 BLDA: 62.1 MM HG (ref 80–100)
RBC # BLD AUTO: 5.25 10*6/MM3 (ref 4.2–5.4)
SAO2 % BLDCOA: 91.4 % (ref 90–100)
WBC NRBC COR # BLD: 15.16 10*3/MM3 (ref 4.5–12.5)

## 2017-12-18 PROCEDURE — 87077 CULTURE AEROBIC IDENTIFY: CPT | Performed by: PHYSICIAN ASSISTANT

## 2017-12-18 PROCEDURE — 71020 XR CHEST 2 VW: CPT | Performed by: RADIOLOGY

## 2017-12-18 PROCEDURE — 83050 HGB METHEMOGLOBIN QUAN: CPT | Performed by: PHYSICIAN ASSISTANT

## 2017-12-18 PROCEDURE — 81025 URINE PREGNANCY TEST: CPT | Performed by: PHYSICIAN ASSISTANT

## 2017-12-18 PROCEDURE — 87086 URINE CULTURE/COLONY COUNT: CPT | Performed by: PHYSICIAN ASSISTANT

## 2017-12-18 PROCEDURE — 94799 UNLISTED PULMONARY SVC/PX: CPT

## 2017-12-18 PROCEDURE — 36600 WITHDRAWAL OF ARTERIAL BLOOD: CPT | Performed by: PHYSICIAN ASSISTANT

## 2017-12-18 PROCEDURE — 82805 BLOOD GASES W/O2 SATURATION: CPT | Performed by: PHYSICIAN ASSISTANT

## 2017-12-18 PROCEDURE — 82375 ASSAY CARBOXYHB QUANT: CPT | Performed by: PHYSICIAN ASSISTANT

## 2017-12-18 PROCEDURE — 85025 COMPLETE CBC W/AUTO DIFF WBC: CPT | Performed by: PHYSICIAN ASSISTANT

## 2017-12-18 PROCEDURE — 81001 URINALYSIS AUTO W/SCOPE: CPT | Performed by: PHYSICIAN ASSISTANT

## 2017-12-18 PROCEDURE — 87147 CULTURE TYPE IMMUNOLOGIC: CPT | Performed by: PHYSICIAN ASSISTANT

## 2017-12-18 PROCEDURE — 25010000002 METHYLPREDNISOLONE PER 125 MG: Performed by: PHYSICIAN ASSISTANT

## 2017-12-18 PROCEDURE — 87186 SC STD MICRODIL/AGAR DIL: CPT | Performed by: PHYSICIAN ASSISTANT

## 2017-12-18 PROCEDURE — 96375 TX/PRO/DX INJ NEW DRUG ADDON: CPT

## 2017-12-18 PROCEDURE — 71020 HC CHEST PA AND LATERAL: CPT

## 2017-12-18 PROCEDURE — 94640 AIRWAY INHALATION TREATMENT: CPT

## 2017-12-18 PROCEDURE — 80053 COMPREHEN METABOLIC PANEL: CPT | Performed by: PHYSICIAN ASSISTANT

## 2017-12-18 PROCEDURE — 99284 EMERGENCY DEPT VISIT MOD MDM: CPT

## 2017-12-18 RX ORDER — METHYLPREDNISOLONE SODIUM SUCCINATE 125 MG/2ML
125 INJECTION, POWDER, LYOPHILIZED, FOR SOLUTION INTRAMUSCULAR; INTRAVENOUS ONCE
Status: COMPLETED | OUTPATIENT
Start: 2017-12-18 | End: 2017-12-18

## 2017-12-18 RX ORDER — SODIUM CHLORIDE 0.9 % (FLUSH) 0.9 %
10 SYRINGE (ML) INJECTION AS NEEDED
Status: DISCONTINUED | OUTPATIENT
Start: 2017-12-18 | End: 2017-12-19 | Stop reason: HOSPADM

## 2017-12-18 RX ORDER — IPRATROPIUM BROMIDE AND ALBUTEROL SULFATE 2.5; .5 MG/3ML; MG/3ML
3 SOLUTION RESPIRATORY (INHALATION) ONCE
Status: COMPLETED | OUTPATIENT
Start: 2017-12-18 | End: 2017-12-18

## 2017-12-18 RX ADMIN — METHYLPREDNISOLONE SODIUM SUCCINATE 125 MG: 125 INJECTION, POWDER, FOR SOLUTION INTRAMUSCULAR; INTRAVENOUS at 23:55

## 2017-12-18 RX ADMIN — Medication 10 ML: at 23:55

## 2017-12-18 RX ADMIN — IPRATROPIUM BROMIDE AND ALBUTEROL SULFATE 3 ML: .5; 3 SOLUTION RESPIRATORY (INHALATION) at 23:27

## 2017-12-19 VITALS
DIASTOLIC BLOOD PRESSURE: 74 MMHG | RESPIRATION RATE: 20 BRPM | OXYGEN SATURATION: 97 % | TEMPERATURE: 98.4 F | SYSTOLIC BLOOD PRESSURE: 134 MMHG | HEIGHT: 55 IN | BODY MASS INDEX: 24.76 KG/M2 | WEIGHT: 107 LBS | HEART RATE: 105 BPM

## 2017-12-19 LAB
ALBUMIN SERPL-MCNC: 4.7 G/DL (ref 3.5–5)
ALBUMIN/GLOB SERPL: 1.4 G/DL (ref 1.5–2.5)
ALP SERPL-CCNC: 120 U/L (ref 35–104)
ALT SERPL W P-5'-P-CCNC: 68 U/L (ref 10–36)
ANION GAP SERPL CALCULATED.3IONS-SCNC: 1.9 MMOL/L (ref 3.6–11.2)
AST SERPL-CCNC: 42 U/L (ref 10–30)
B-HCG UR QL: NEGATIVE
BACTERIA UR QL AUTO: ABNORMAL /HPF
BILIRUB SERPL-MCNC: 0.3 MG/DL (ref 0.2–1.8)
BILIRUB UR QL STRIP: NEGATIVE
BUN BLD-MCNC: 7 MG/DL (ref 7–21)
BUN/CREAT SERPL: 9.6 (ref 7–25)
CALCIUM SPEC-SCNC: 9.8 MG/DL (ref 7.7–10)
CHLORIDE SERPL-SCNC: 106 MMOL/L (ref 99–112)
CLARITY UR: CLEAR
CO2 SERPL-SCNC: 36.1 MMOL/L (ref 24.3–31.9)
COLOR UR: YELLOW
CREAT BLD-MCNC: 0.73 MG/DL (ref 0.43–1.29)
GFR SERPL CREATININE-BSD FRML MDRD: 99 ML/MIN/1.73
GLOBULIN UR ELPH-MCNC: 3.3 GM/DL
GLUCOSE BLD-MCNC: 122 MG/DL (ref 70–110)
GLUCOSE UR STRIP-MCNC: NEGATIVE MG/DL
HGB UR QL STRIP.AUTO: NEGATIVE
HYALINE CASTS UR QL AUTO: ABNORMAL /LPF
KETONES UR QL STRIP: NEGATIVE
LEUKOCYTE ESTERASE UR QL STRIP.AUTO: ABNORMAL
NITRITE UR QL STRIP: NEGATIVE
OSMOLALITY SERPL CALC.SUM OF ELEC: 286.1 MOSM/KG (ref 273–305)
PH UR STRIP.AUTO: 5.5 [PH] (ref 5–8)
POTASSIUM BLD-SCNC: 3.5 MMOL/L (ref 3.5–5.3)
PROT SERPL-MCNC: 8 G/DL (ref 6–8)
PROT UR QL STRIP: NEGATIVE
RBC # UR: ABNORMAL /HPF
REF LAB TEST METHOD: ABNORMAL
SODIUM BLD-SCNC: 144 MMOL/L (ref 135–153)
SP GR UR STRIP: 1.01 (ref 1–1.03)
SQUAMOUS #/AREA URNS HPF: ABNORMAL /HPF
UROBILINOGEN UR QL STRIP: ABNORMAL
WBC UR QL AUTO: ABNORMAL /HPF

## 2017-12-19 PROCEDURE — 25010000002 CEFTRIAXONE: Performed by: PHYSICIAN ASSISTANT

## 2017-12-19 PROCEDURE — 96365 THER/PROPH/DIAG IV INF INIT: CPT

## 2017-12-19 PROCEDURE — 94799 UNLISTED PULMONARY SVC/PX: CPT

## 2017-12-19 RX ORDER — IPRATROPIUM BROMIDE AND ALBUTEROL SULFATE 2.5; .5 MG/3ML; MG/3ML
3 SOLUTION RESPIRATORY (INHALATION) ONCE
Status: COMPLETED | OUTPATIENT
Start: 2017-12-19 | End: 2017-12-19

## 2017-12-19 RX ORDER — PREDNISONE 10 MG/1
10 TABLET ORAL 2 TIMES DAILY
Qty: 10 TABLET | Refills: 0 | Status: SHIPPED | OUTPATIENT
Start: 2017-12-19 | End: 2017-12-24

## 2017-12-19 RX ORDER — CEFDINIR 300 MG/1
300 CAPSULE ORAL 2 TIMES DAILY
Qty: 10 CAPSULE | Refills: 0 | Status: SHIPPED | OUTPATIENT
Start: 2017-12-19 | End: 2017-12-24

## 2017-12-19 RX ADMIN — IPRATROPIUM BROMIDE AND ALBUTEROL SULFATE 3 ML: .5; 3 SOLUTION RESPIRATORY (INHALATION) at 00:25

## 2017-12-19 RX ADMIN — CEFTRIAXONE 1 G: 1 INJECTION, POWDER, FOR SOLUTION INTRAMUSCULAR; INTRAVENOUS at 00:37

## 2017-12-19 RX ADMIN — Medication 10 ML: at 00:38

## 2017-12-19 NOTE — ED PROVIDER NOTES
Subjective   Patient is a 22 y.o. female presenting with shortness of breath.   History provided by:  Patient   used: No    Shortness of Breath   Severity:  Moderate  Onset quality:  Sudden  Duration:  3 hours  Timing:  Constant  Progression:  Worsening  Chronicity:  New  Relieved by:  Nothing  Worsened by:  Nothing  Ineffective treatments:  None tried  Associated symptoms: cough and wheezing    Risk factors: no recent alcohol use, no family hx of DVT, no hx of cancer, no hx of PE/DVT, no obesity, no oral contraceptive use, no prolonged immobilization, no recent surgery and no tobacco use    Risk factors comment:  Asthma      Review of Systems   Constitutional: Negative.    HENT: Negative.    Eyes: Negative.    Respiratory: Positive for cough, shortness of breath and wheezing.    Cardiovascular: Negative.    Gastrointestinal: Negative.    Endocrine: Negative.    Genitourinary: Negative.    Musculoskeletal: Negative.    Skin: Negative.    Allergic/Immunologic: Negative.    Neurological: Negative.    Hematological: Negative.    Psychiatric/Behavioral: Negative.    All other systems reviewed and are negative.      Past Medical History:   Diagnosis Date   • Asthma    • Seizures     Seizures as a child with last seizure around 10 years ago       Allergies   Allergen Reactions   • Shellfish-Derived Products        Past Surgical History:   Procedure Laterality Date   • HERNIA REPAIR         Family History   Problem Relation Age of Onset   • COPD Mother        Social History     Social History   • Marital status:      Spouse name: N/A   • Number of children: N/A   • Years of education: N/A     Social History Main Topics   • Smoking status: Never Smoker   • Smokeless tobacco: Never Used   • Alcohol use No   • Drug use: No      Comment: hx of drug abuse pt states shge quit over a year ago   • Sexual activity: Defer     Other Topics Concern   • None     Social History Narrative           Objective    Physical Exam   Constitutional: She is oriented to person, place, and time. She appears well-developed and well-nourished.   HENT:   Head: Normocephalic and atraumatic.   Right Ear: External ear normal.   Left Ear: External ear normal.   Nose: Nose normal.   Mouth/Throat: Oropharynx is clear and moist.   Eyes: Conjunctivae and EOM are normal. Pupils are equal, round, and reactive to light.   Neck: Normal range of motion. Neck supple.   Cardiovascular: Normal rate, regular rhythm, normal heart sounds and intact distal pulses.    Pulmonary/Chest: Effort normal. She has wheezes.   EMS placed pt on NC 3L, upon arrival RN placed patient on 2L.    Abdominal: Soft. Bowel sounds are normal.   Musculoskeletal: Normal range of motion.   Neurological: She is alert and oriented to person, place, and time.   Skin: Skin is warm and dry.   Psychiatric: She has a normal mood and affect. Her behavior is normal. Judgment and thought content normal.   Nursing note and vitals reviewed.      Procedures         ED Course  ED Course   Value Comment By Time   XR Chest 2 View NAD per LADONNA Briceño 12/19 0003    Patient taken off nasal canula. Patient ambulates on RA, O2 sat maintaining at or above 95%. Discussed sx that would warrant return to the ED. Instructed PCP f/u. Patient states she is feeling much better and would like to go home. LADONNA Espinoza 12/19 0129                  [facility]    Final diagnoses:   Moderate asthma with exacerbation, unspecified whether persistent   Urinary tract infection without hematuria, site unspecified            LADONNA Espinoza  12/19/17 0147

## 2017-12-22 LAB — BACTERIA SPEC AEROBE CULT: NORMAL

## 2022-08-23 NOTE — PROGRESS NOTES
Continued Stay Note  MARYCRUZ Wilder     Patient Name: Deborah Rosas  MRN: [id number]  Today's Date: 8/14/2017    Admit Date: 8/10/2017          Discharge Plan       08/14/17 1228    Case Management/Social Work Plan    Plan Patient is much improved. She lives at home with her  and 2 month old daughter. She plans to return there at discharge. Patient has a nebulizer at home. No further needs identified at this time. CM will continue to follow.    Patient/Family In Agreement With Plan yes              Discharge Codes     None        Expected Discharge Date and Time     Expected Discharge Date Expected Discharge Time    Aug 14, 2017             Mandy Wright RN     Male